# Patient Record
Sex: FEMALE | Race: BLACK OR AFRICAN AMERICAN | NOT HISPANIC OR LATINO | Employment: FULL TIME | URBAN - METROPOLITAN AREA
[De-identification: names, ages, dates, MRNs, and addresses within clinical notes are randomized per-mention and may not be internally consistent; named-entity substitution may affect disease eponyms.]

---

## 2018-06-04 ENCOUNTER — OFFICE VISIT (OUTPATIENT)
Dept: CARDIOLOGY | Facility: CLINIC | Age: 63
End: 2018-06-04
Payer: OTHER GOVERNMENT

## 2018-06-04 VITALS
SYSTOLIC BLOOD PRESSURE: 154 MMHG | BODY MASS INDEX: 28.87 KG/M2 | HEIGHT: 63 IN | HEART RATE: 79 BPM | OXYGEN SATURATION: 100 % | WEIGHT: 162.94 LBS | DIASTOLIC BLOOD PRESSURE: 72 MMHG | RESPIRATION RATE: 15 BRPM

## 2018-06-04 DIAGNOSIS — R07.2 PRECORDIAL PAIN: ICD-10-CM

## 2018-06-04 DIAGNOSIS — F41.9 ANXIETY: ICD-10-CM

## 2018-06-04 DIAGNOSIS — I10 ESSENTIAL HYPERTENSION: Primary | ICD-10-CM

## 2018-06-04 DIAGNOSIS — E11.9 TYPE 2 DIABETES MELLITUS WITHOUT COMPLICATION, WITHOUT LONG-TERM CURRENT USE OF INSULIN: ICD-10-CM

## 2018-06-04 DIAGNOSIS — R94.31 ABNORMAL EKG: ICD-10-CM

## 2018-06-04 DIAGNOSIS — E78.5 HYPERLIPIDEMIA, UNSPECIFIED HYPERLIPIDEMIA TYPE: ICD-10-CM

## 2018-06-04 PROCEDURE — 99999 PR PBB SHADOW E&M-EST. PATIENT-LVL III: CPT | Mod: PBBFAC,,, | Performed by: INTERNAL MEDICINE

## 2018-06-04 PROCEDURE — 93005 ELECTROCARDIOGRAM TRACING: CPT | Mod: PBBFAC | Performed by: INTERNAL MEDICINE

## 2018-06-04 PROCEDURE — 99213 OFFICE O/P EST LOW 20 MIN: CPT | Mod: PBBFAC | Performed by: INTERNAL MEDICINE

## 2018-06-04 PROCEDURE — 99214 OFFICE O/P EST MOD 30 MIN: CPT | Mod: S$PBB,,, | Performed by: INTERNAL MEDICINE

## 2018-06-04 PROCEDURE — 93010 ELECTROCARDIOGRAM REPORT: CPT | Mod: S$PBB,,, | Performed by: INTERNAL MEDICINE

## 2018-06-04 RX ORDER — CYCLOBENZAPRINE HCL 5 MG
5 TABLET ORAL 3 TIMES DAILY PRN
COMMUNITY

## 2018-06-04 RX ORDER — POTASSIUM CHLORIDE 750 MG/1
10 CAPSULE, EXTENDED RELEASE ORAL ONCE
COMMUNITY
End: 2019-09-24 | Stop reason: SDUPTHER

## 2018-06-04 RX ORDER — RAMIPRIL 5 MG/1
5 CAPSULE ORAL DAILY
Qty: 90 CAPSULE | Refills: 3 | Status: SHIPPED | OUTPATIENT
Start: 2018-06-04 | End: 2018-07-10 | Stop reason: SDUPTHER

## 2018-06-04 RX ORDER — AMOXICILLIN 500 MG
CAPSULE ORAL DAILY
COMMUNITY

## 2018-06-04 NOTE — PROGRESS NOTES
CARDIOVASCULAR PROGRESS NOTE    REASON FOR CONSULT:   Makeda Lynch is a 62 y.o. female who presents for follow up of CP, HTN.    PCP: Navid  HISTORY OF PRESENT ILLNESS:   The patient comes in for follow-up.  It is been nearly 2 years since she was last seen in the office.  She's complaining of episodic right-sided atypical chest discomfort which will last for approximately a minute at a clip and occur while at rest.  The patient also describes shortness of breath at rest.  She says that these episodes of chest discomfort and shortness breath occur with anxiety.  She denies any exertional symptoms, but does not exercise on that much.  She otherwise had no palpitations, lightheadedness, dizziness, or syncope.  There's been no PND, orthopnea, or lower extremity edema.  She denies melena, hematuria, or claudicant symptoms.    CARDIOVASCULAR HISTORY:   Cath 2/1/16 normal cors/LVEF    PAST MEDICAL HISTORY:     Past Medical History:   Diagnosis Date    Anxiety     Diabetes mellitus     Hypercholesteremia     Hypertension     Hypothyroidism        PAST SURGICAL HISTORY:     Past Surgical History:   Procedure Laterality Date    CYST REMOVAL      HYSTERECTOMY      WA REMOVAL OF OVARY/TUBE(S)      TONSILLECTOMY         ALLERGIES AND MEDICATION:     Review of patient's allergies indicates:   Allergen Reactions    Codeine Shortness Of Breath     Airway closed    Asa [aspirin]     Azithromycin     Benadryl [diphenhydramine hcl]     Iodinated contrast- oral and iv dye     Latex, natural rubber     Penicillins     Sulfa (sulfonamide antibiotics)     Vicodin [hydrocodone-acetaminophen]      Previous Medications    ALBUTEROL INHL    Inhale into the lungs.    ALPRAZOLAM (XANAX) 0.5 MG TABLET    Take 0.5 mg by mouth 3 (three) times daily.    ATORVASTATIN (LIPITOR) 20 MG TABLET        CYCLOBENZAPRINE (FLEXERIL) 5 MG TABLET    Take 5 mg by mouth 3 (three) times daily as needed for Muscle spasms.    FISH OIL-OMEGA-3  FATTY ACIDS 300-1,000 MG CAPSULE    Take by mouth once daily.    HYDROCHLOROTHIAZIDE (HYDRODIURIL) 12.5 MG TAB    Take 12.5 mg by mouth once daily.    LEVOTHYROXINE 25 MCG CAP    Take by mouth.    METFORMIN (GLUCOPHAGE) 500 MG TABLET    Take 1 tablet (500 mg total) by mouth 2 (two) times daily with meals.    POTASSIUM CHLORIDE (MICRO-K) 10 MEQ CPSR    Take 10 mEq by mouth once.    RAMIPRIL (ALTACE) 2.5 MG CAPSULE    Take 2.5 mg by mouth once daily.       SOCIAL HISTORY:     Social History     Social History    Marital status:      Spouse name: N/A    Number of children: N/A    Years of education: N/A     Occupational History    Not on file.     Social History Main Topics    Smoking status: Never Smoker    Smokeless tobacco: Never Used    Alcohol use No    Drug use: No    Sexual activity: No     Other Topics Concern    Not on file     Social History Narrative    No narrative on file       FAMILY HISTORY:     Family History   Problem Relation Age of Onset    Hypertension Father     Diabetes Mother     Breast cancer Neg Hx     Colon cancer Neg Hx     Ovarian cancer Neg Hx        REVIEW OF SYSTEMS:   Review of Systems   Constitutional: Negative for chills, diaphoresis and fever.   HENT: Negative for nosebleeds.    Eyes: Negative for blurred vision, double vision and photophobia.   Respiratory: Positive for shortness of breath. Negative for hemoptysis and wheezing.    Cardiovascular: Positive for chest pain. Negative for palpitations, orthopnea, claudication, leg swelling and PND.   Gastrointestinal: Negative for abdominal pain, blood in stool, heartburn, melena, nausea and vomiting.   Genitourinary: Negative for flank pain and hematuria.   Musculoskeletal: Negative for falls, myalgias and neck pain.   Skin: Negative for rash.   Neurological: Negative for dizziness, seizures, loss of consciousness, weakness and headaches.   Endo/Heme/Allergies: Negative for polydipsia. Does not bruise/bleed easily.  "  Psychiatric/Behavioral: Negative for depression and memory loss. The patient is nervous/anxious.        PHYSICAL EXAM:     Vitals:    06/04/18 0853   BP: (!) 154/72   Pulse: 79   Resp: 15    Body mass index is 28.86 kg/m².  Weight: 73.9 kg (162 lb 14.7 oz)   Height: 5' 3" (160 cm)     Physical Exam   Constitutional: She is oriented to person, place, and time. She appears well-developed and well-nourished. She is cooperative.  Non-toxic appearance. No distress.   HENT:   Head: Normocephalic and atraumatic.   Eyes: Conjunctivae and EOM are normal. Pupils are equal, round, and reactive to light. No scleral icterus.   Neck: Trachea normal and normal range of motion. Neck supple. Normal carotid pulses and no JVD present. Carotid bruit is not present. No neck rigidity. No tracheal deviation and no edema present. No thyromegaly present.   Cardiovascular: Normal rate, regular rhythm, S1 normal and S2 normal.  PMI is not displaced.  Exam reveals no gallop and no friction rub.    No murmur heard.  Pulses:       Carotid pulses are 2+ on the right side, and 2+ on the left side.  Pulmonary/Chest: Effort normal and breath sounds normal. No respiratory distress. She has no wheezes. She has no rales. She exhibits no tenderness.   Abdominal: Soft. She exhibits no distension. There is no hepatosplenomegaly.   Musculoskeletal: She exhibits no edema or tenderness.   Feet:   Right Foot:   Skin Integrity: Negative for ulcer.   Left Foot:   Skin Integrity: Negative for ulcer.   Neurological: She is alert and oriented to person, place, and time. No cranial nerve deficit.   Skin: Skin is warm and dry. No rash noted. No erythema.   Psychiatric: She has a normal mood and affect. Her speech is normal and behavior is normal.   Vitals reviewed.      DATA:   EKG: (personally reviewed tracing)  6/4/18 SR 78, NSSTTW changes    Laboratory:  CBC:    Recent Labs  Lab 01/31/16  1730 04/18/16  2208 01/23/18  1021   WHITE BLOOD CELL COUNT 5.46 5.86 4.6 "   HEMOGLOBIN 13.0 13.0 13.7   HEMATOCRIT 38.4 38.8 40.6   PLATELETS 201 201 210       CHEMISTRIES:    Recent Labs  Lab 12/09/15  1600 01/31/16  1730 04/18/16  2208   GLUCOSE 107 229 H 122 H   SODIUM 141 142 143   POTASSIUM 3.6 3.8 3.8   BUN BLD 17 19 15   CREATININE 0.8 0.8 0.7   EGFR IF  >60 >60 >60   EGFR IF NON- >60 >60 >60   CALCIUM 9.6 9.1 9.9       CARDIAC BIOMARKERS:    Recent Labs  Lab 01/31/16  2336 02/01/16  0551 04/18/16  2208   TROPONIN I 0.006 <0.006 <0.006       COAGS:    Recent Labs  Lab 01/31/16  1730 04/18/16  2208   INR 1.0 1.0       LIPIDS/LFTS:    Recent Labs  Lab 12/09/15  1600 01/31/16  1730 02/01/16  0551 04/18/16  2208   CHOLESTEROL  --   --  211 H  --    TRIGLYCERIDES  --   --  149  --    HDL  --   --  46  --    LDL CHOLESTEROL  --   --  135.2  --    NON-HDL CHOLESTEROL  --   --  165  --    AST 24 28  --  23   ALT 32 30  --  31       Cardiovascular Testing:  Echo 12/21/15:  1 - Normal left ventricular systolic function (EF 55-60%).   2 - Concentric remodeling.      Ex-MPI 12/21/15:  9min Javier 102% MPHR  Nuclear Quantitative Functional Analysis:   LVEF: >= 70 %  Impression: NORMAL MYOCARDIAL PERFUSION  1. The perfusion scan is free of evidence for myocardial ischemia or injury.   2. Resting wall motion is physiologic.   3. Resting LV function is normal.   4. The ventricular volumes are normal at rest and stress.   5. The extracardiac distribution of radioactivity is normal.     Cath 2/1/16  LVEDP (Pre): 7 mmHg  Ejection Fraction: 70%  C. ANGIOGRAPHIC RESULTS:  Patient has a right dominant coronary artery.   - Left Main Coronary Artery:  The LM is normal. There is HOUSTON 3 flow.  - Left Anterior Descending Artery:  The LAD has luminal irregularities. There is HOUSTON 3 flow.  - Left Circumflex Artery:  The LCX is normal. There is HOUSTON 3 flow.  - Right Coronary Artery:  The RCA is normal. There is HOUSTON 3 flow.  D. SUMMARY/POST-OPERATIVE DIAGNOSIS:  1. Recurrent CP  with recent neg MPI.  2. Essentially normal coronary arteries.  3. Normal LV function.     ASSESSMENT:   # CP, atypical. Prior testing (including cardiac cath) normal 2016.  Consider GI consult if sxs persist.  # HTN, uncontrolled.   # HLP, on atorva 20mg  # DM, per IM  # h/o ASA allergy  # abnl EKG     PLAN:   Cont med rx  Stop HCTZ  Inc ramipril 5mg qd  Check BMP 2 weeks  Check echo  Obtain lipid panel/LFT from PCP  Consider GI eval if sxs of CP persist  Pt to bring BP log to next OV  RTC 4 weeks        Conrad España MD, FACC

## 2018-06-20 ENCOUNTER — HOSPITAL ENCOUNTER (OUTPATIENT)
Dept: CARDIOLOGY | Facility: HOSPITAL | Age: 63
Discharge: HOME | End: 2018-06-20
Attending: INTERNAL MEDICINE
Payer: OTHER GOVERNMENT

## 2018-06-20 DIAGNOSIS — R07.2 PRECORDIAL PAIN: ICD-10-CM

## 2018-06-20 DIAGNOSIS — I10 ESSENTIAL HYPERTENSION: ICD-10-CM

## 2018-06-20 DIAGNOSIS — R94.31 ABNORMAL EKG: ICD-10-CM

## 2018-06-20 LAB
DIASTOLIC DYSFUNCTION: NO
ESTIMATED PA SYSTOLIC PRESSURE: 21.84
MITRAL VALVE REGURGITATION: NORMAL
RETIRED EF AND QEF - SEE NOTES: 60 (ref 55–65)
TRICUSPID VALVE REGURGITATION: NORMAL

## 2018-06-20 PROCEDURE — 93306 TTE W/DOPPLER COMPLETE: CPT | Mod: 26,,, | Performed by: INTERNAL MEDICINE

## 2018-06-20 PROCEDURE — 93306 TTE W/DOPPLER COMPLETE: CPT

## 2018-07-10 ENCOUNTER — OFFICE VISIT (OUTPATIENT)
Dept: CARDIOLOGY | Facility: CLINIC | Age: 63
End: 2018-07-10
Payer: OTHER GOVERNMENT

## 2018-07-10 VITALS
HEIGHT: 63 IN | SYSTOLIC BLOOD PRESSURE: 152 MMHG | WEIGHT: 158 LBS | HEART RATE: 62 BPM | BODY MASS INDEX: 28 KG/M2 | RESPIRATION RATE: 15 BRPM | DIASTOLIC BLOOD PRESSURE: 78 MMHG | OXYGEN SATURATION: 98 %

## 2018-07-10 DIAGNOSIS — I10 ESSENTIAL HYPERTENSION: Primary | ICD-10-CM

## 2018-07-10 DIAGNOSIS — E11.9 TYPE 2 DIABETES MELLITUS WITHOUT COMPLICATION, WITHOUT LONG-TERM CURRENT USE OF INSULIN: ICD-10-CM

## 2018-07-10 DIAGNOSIS — E78.2 MIXED HYPERLIPIDEMIA: ICD-10-CM

## 2018-07-10 PROCEDURE — 99213 OFFICE O/P EST LOW 20 MIN: CPT | Mod: PBBFAC | Performed by: INTERNAL MEDICINE

## 2018-07-10 PROCEDURE — 99213 OFFICE O/P EST LOW 20 MIN: CPT | Mod: S$PBB,,, | Performed by: INTERNAL MEDICINE

## 2018-07-10 PROCEDURE — 99999 PR PBB SHADOW E&M-EST. PATIENT-LVL III: CPT | Mod: PBBFAC,,, | Performed by: INTERNAL MEDICINE

## 2018-07-10 RX ORDER — RAMIPRIL 10 MG/1
10 CAPSULE ORAL DAILY
Qty: 90 CAPSULE | Refills: 3 | Status: SHIPPED | OUTPATIENT
Start: 2018-07-10 | End: 2018-08-13 | Stop reason: SDUPTHER

## 2018-07-10 NOTE — PROGRESS NOTES
CARDIOVASCULAR PROGRESS NOTE    REASON FOR CONSULT:   Makeda Lynch is a 62 y.o. female who presents for follow up of CP, HTN.    PCP: Navid  HISTORY OF PRESENT ILLNESS:   The patient comes in for follow-up.  She reports no further chest discomfort, nor any dyspnea.  There has been no palpitations, lightheadedness, dizziness, or syncope.  She denies PND, orthopnea, or lower extremity edema.  There has been no melena, hematuria, or claudicant symptoms.    The patient brings with her a home log of her blood pressures noting sporadic measurements greater than 140/90.  I also reviewed records of recent laboratories from the patient's primary care physician.    CARDIOVASCULAR HISTORY:   Cath 2/1/16 normal cors/LVEF    PAST MEDICAL HISTORY:     Past Medical History:   Diagnosis Date    Anxiety     Diabetes mellitus     Hypercholesteremia     Hypertension     Hypothyroidism        PAST SURGICAL HISTORY:     Past Surgical History:   Procedure Laterality Date    CYST REMOVAL      HYSTERECTOMY      ID REMOVAL OF OVARY/TUBE(S)      TONSILLECTOMY         ALLERGIES AND MEDICATION:     Review of patient's allergies indicates:   Allergen Reactions    Codeine Shortness Of Breath     Airway closed    Asa [aspirin]     Azithromycin     Benadryl [diphenhydramine hcl]     Iodinated contrast- oral and iv dye     Latex, natural rubber     Penicillins     Sulfa (sulfonamide antibiotics)     Vicodin [hydrocodone-acetaminophen]      Previous Medications    ALBUTEROL INHL    Inhale into the lungs.    ALPRAZOLAM (XANAX) 0.5 MG TABLET    Take 0.5 mg by mouth 3 (three) times daily.    ATORVASTATIN (LIPITOR) 20 MG TABLET        CYCLOBENZAPRINE (FLEXERIL) 5 MG TABLET    Take 5 mg by mouth 3 (three) times daily as needed for Muscle spasms.    FISH OIL-OMEGA-3 FATTY ACIDS 300-1,000 MG CAPSULE    Take by mouth once daily.    LEVOTHYROXINE 25 MCG CAP    Take by mouth.    METFORMIN (GLUCOPHAGE) 500 MG TABLET    Take 1 tablet (500  "mg total) by mouth 2 (two) times daily with meals.    POTASSIUM CHLORIDE (MICRO-K) 10 MEQ CPSR    Take 10 mEq by mouth once.    RAMIPRIL (ALTACE) 5 MG CAPSULE    Take 1 capsule (5 mg total) by mouth once daily.       SOCIAL HISTORY:     Social History     Social History    Marital status:      Spouse name: N/A    Number of children: N/A    Years of education: N/A     Occupational History    Not on file.     Social History Main Topics    Smoking status: Never Smoker    Smokeless tobacco: Never Used    Alcohol use No    Drug use: No    Sexual activity: No     Other Topics Concern    Not on file     Social History Narrative    No narrative on file       FAMILY HISTORY:     Family History   Problem Relation Age of Onset    Hypertension Father     Diabetes Mother     Breast cancer Neg Hx     Colon cancer Neg Hx     Ovarian cancer Neg Hx        REVIEW OF SYSTEMS:   Review of Systems   Constitutional: Negative for chills, diaphoresis and fever.   HENT: Negative for nosebleeds.    Eyes: Negative for blurred vision, double vision and photophobia.   Respiratory: Negative for hemoptysis, shortness of breath and wheezing.    Cardiovascular: Negative for chest pain, palpitations, orthopnea, claudication, leg swelling and PND.   Gastrointestinal: Negative for abdominal pain, blood in stool, heartburn, melena, nausea and vomiting.   Genitourinary: Negative for flank pain and hematuria.   Musculoskeletal: Negative for falls, myalgias and neck pain.   Skin: Negative for rash.   Neurological: Negative for dizziness, seizures, loss of consciousness, weakness and headaches.   Endo/Heme/Allergies: Negative for polydipsia. Does not bruise/bleed easily.   Psychiatric/Behavioral: Negative for depression and memory loss.       PHYSICAL EXAM:     Vitals:    07/10/18 0841   BP: (!) 152/78   Pulse: 62   Resp: 15    Body mass index is 27.99 kg/m².  Weight: 71.7 kg (158 lb)   Height: 5' 3" (160 cm)     Physical Exam "   Constitutional: She is oriented to person, place, and time. She appears well-developed and well-nourished. She is cooperative.  Non-toxic appearance. No distress.   HENT:   Head: Normocephalic and atraumatic.   Eyes: Conjunctivae and EOM are normal. Pupils are equal, round, and reactive to light. No scleral icterus.   Neck: Trachea normal and normal range of motion. Neck supple. Normal carotid pulses and no JVD present. Carotid bruit is not present. No neck rigidity. No tracheal deviation and no edema present. No thyromegaly present.   Cardiovascular: Normal rate, regular rhythm, S1 normal and S2 normal.  PMI is not displaced.  Exam reveals no gallop and no friction rub.    No murmur heard.  Pulses:       Carotid pulses are 2+ on the right side, and 2+ on the left side.  Pulmonary/Chest: Effort normal and breath sounds normal. No respiratory distress. She has no wheezes. She has no rales. She exhibits no tenderness.   Abdominal: Soft. She exhibits no distension. There is no hepatosplenomegaly.   Musculoskeletal: She exhibits no edema or tenderness.   Feet:   Right Foot:   Skin Integrity: Negative for ulcer.   Left Foot:   Skin Integrity: Negative for ulcer.   Neurological: She is alert and oriented to person, place, and time. No cranial nerve deficit.   Skin: Skin is warm and dry. No rash noted. No erythema.   Psychiatric: She has a normal mood and affect. Her speech is normal and behavior is normal.   Vitals reviewed.      DATA:   EKG: (personally reviewed tracing)  6/4/18 SR 78, NSSTTW changes    Laboratory:  CBC:    Recent Labs  Lab 01/31/16  1730 04/18/16 2208 01/23/18  1021   WHITE BLOOD CELL COUNT 5.46 5.86 4.6   HEMOGLOBIN 13.0 13.0 13.7   HEMATOCRIT 38.4 38.8 40.6   PLATELETS 201 201 210       CHEMISTRIES:    Recent Labs  Lab 01/31/16  1730 04/18/16  2208 06/20/18  0751   GLUCOSE 229 H 122 H 189 H   SODIUM 142 143 139   POTASSIUM 3.8 3.8 4.2   BUN BLD 19 15 16   CREATININE 0.8 0.7 0.8   EGFR IF   AMERICAN >60 >60 >60   EGFR IF NON- >60 >60 >60   CALCIUM 9.1 9.9 9.4       CARDIAC BIOMARKERS:    Recent Labs  Lab 01/31/16  2336 02/01/16  0551 04/18/16  2208   TROPONIN I 0.006 <0.006 <0.006       COAGS:    Recent Labs  Lab 01/31/16  1730 04/18/16  2208   INR 1.0 1.0       LIPIDS/LFTS:    Recent Labs  Lab 12/09/15  1600 01/31/16  1730 02/01/16  0551 04/18/16  2208   CHOLESTEROL  --   --  211 H  --    TRIGLYCERIDES  --   --  149  --    HDL  --   --  46  --    LDL CHOLESTEROL  --   --  135.2  --    NON-HDL CHOLESTEROL  --   --  165  --    AST 24 28  --  23   ALT 32 30  --  31     Lipid panel from PCP 8/2017:    TG 67  HDL 45  LDL 73    LFT from [facility] 5/2018:  AST 22  ALT 28    Cardiovascular Testing:  Echo 6/20/18    1 - Normal left ventricular systolic function (EF 60-65%).     2 - Concentric remodeling.     3 - Trivial to mild mitral regurgitation.     4 - Trivial to mild tricuspid regurgitation.     5 - Trivial pulmonic regurgitation.      Ex-MPI 12/21/15:  9min Javier 102% MPHR  Nuclear Quantitative Functional Analysis:   LVEF: >= 70 %  Impression: NORMAL MYOCARDIAL PERFUSION  1. The perfusion scan is free of evidence for myocardial ischemia or injury.   2. Resting wall motion is physiologic.   3. Resting LV function is normal.   4. The ventricular volumes are normal at rest and stress.   5. The extracardiac distribution of radioactivity is normal.     Cath 2/1/16  LVEDP (Pre): 7 mmHg  Ejection Fraction: 70%  C. ANGIOGRAPHIC RESULTS:  Patient has a right dominant coronary artery.   - Left Main Coronary Artery:  The LM is normal. There is HOUSTON 3 flow.  - Left Anterior Descending Artery:  The LAD has luminal irregularities. There is HOUSTON 3 flow.  - Left Circumflex Artery:  The LCX is normal. There is HOUSTON 3 flow.  - Right Coronary Artery:  The RCA is normal. There is HOUSTON 3 flow.  D. SUMMARY/POST-OPERATIVE DIAGNOSIS:  1. Recurrent CP with recent neg MPI.  2. Essentially normal coronary  arteries.  3. Normal LV function.     ASSESSMENT:   # CP, resolved. Prior testing (including cardiac cath) normal 2016.  Echo 6/2018 normal.  Consider GI consult if sxs recur.  # HTN, uncontrolled.   # HLP, on atorva 20mg  # DM, per IM  # h/o ASA allergy  # abnl EKG     PLAN:   Cont med rx  Inc ramipril 10mg qd  Check BMP 2 weeks  RTC 1 month        Conrad España MD, FACC

## 2018-07-24 ENCOUNTER — LAB VISIT (OUTPATIENT)
Dept: LAB | Facility: HOSPITAL | Age: 63
End: 2018-07-24
Attending: INTERNAL MEDICINE
Payer: OTHER GOVERNMENT

## 2018-07-24 DIAGNOSIS — I10 ESSENTIAL HYPERTENSION: ICD-10-CM

## 2018-07-24 LAB
ANION GAP SERPL CALC-SCNC: 11 MMOL/L
BUN SERPL-MCNC: 13 MG/DL
CALCIUM SERPL-MCNC: 9.5 MG/DL
CHLORIDE SERPL-SCNC: 105 MMOL/L
CO2 SERPL-SCNC: 25 MMOL/L
CREAT SERPL-MCNC: 0.8 MG/DL
EST. GFR  (AFRICAN AMERICAN): >60 ML/MIN/1.73 M^2
EST. GFR  (NON AFRICAN AMERICAN): >60 ML/MIN/1.73 M^2
GLUCOSE SERPL-MCNC: 238 MG/DL
POTASSIUM SERPL-SCNC: 4.8 MMOL/L
SODIUM SERPL-SCNC: 141 MMOL/L

## 2018-07-24 PROCEDURE — 80048 BASIC METABOLIC PNL TOTAL CA: CPT

## 2018-07-24 PROCEDURE — 36415 COLL VENOUS BLD VENIPUNCTURE: CPT

## 2018-08-13 ENCOUNTER — OFFICE VISIT (OUTPATIENT)
Dept: CARDIOLOGY | Facility: CLINIC | Age: 63
End: 2018-08-13
Payer: OTHER GOVERNMENT

## 2018-08-13 VITALS
SYSTOLIC BLOOD PRESSURE: 142 MMHG | DIASTOLIC BLOOD PRESSURE: 78 MMHG | WEIGHT: 163 LBS | HEIGHT: 63 IN | RESPIRATION RATE: 15 BRPM | HEART RATE: 80 BPM | OXYGEN SATURATION: 100 % | BODY MASS INDEX: 28.88 KG/M2

## 2018-08-13 DIAGNOSIS — E11.9 TYPE 2 DIABETES MELLITUS WITHOUT COMPLICATION, WITHOUT LONG-TERM CURRENT USE OF INSULIN: ICD-10-CM

## 2018-08-13 DIAGNOSIS — I10 ESSENTIAL HYPERTENSION: Primary | ICD-10-CM

## 2018-08-13 DIAGNOSIS — E78.2 MIXED HYPERLIPIDEMIA: ICD-10-CM

## 2018-08-13 PROCEDURE — 99213 OFFICE O/P EST LOW 20 MIN: CPT | Mod: S$PBB,,, | Performed by: INTERNAL MEDICINE

## 2018-08-13 PROCEDURE — 99214 OFFICE O/P EST MOD 30 MIN: CPT | Mod: PBBFAC | Performed by: INTERNAL MEDICINE

## 2018-08-13 PROCEDURE — 99999 PR PBB SHADOW E&M-EST. PATIENT-LVL IV: CPT | Mod: PBBFAC,,, | Performed by: INTERNAL MEDICINE

## 2018-08-13 RX ORDER — RAMIPRIL 10 MG/1
20 CAPSULE ORAL DAILY
Qty: 180 CAPSULE | Refills: 3 | Status: SHIPPED | OUTPATIENT
Start: 2018-08-13 | End: 2018-11-07 | Stop reason: SDUPTHER

## 2018-08-13 NOTE — PROGRESS NOTES
CARDIOVASCULAR PROGRESS NOTE    REASON FOR CONSULT:   Makeda Lynch is a 62 y.o. female who presents for follow up of CP, HTN.    PCP: Navid  HISTORY OF PRESENT ILLNESS:   The patient comes in for follow-up.  She reports no chest discomfort, nor any dyspnea.  There has been no palpitations, lightheadedness, dizziness, or syncope.  She denies PND, orthopnea, or lower extremity edema.  There has been no melena, hematuria, or claudicant symptoms.    CARDIOVASCULAR HISTORY:   Cath 2/1/16 normal cors/LVEF    PAST MEDICAL HISTORY:     Past Medical History:   Diagnosis Date    Anxiety     Diabetes mellitus     Hypercholesteremia     Hypertension     Hypothyroidism        PAST SURGICAL HISTORY:     Past Surgical History:   Procedure Laterality Date    CYST REMOVAL      HYSTERECTOMY      FL REMOVAL OF OVARY/TUBE(S)      TONSILLECTOMY         ALLERGIES AND MEDICATION:     Review of patient's allergies indicates:   Allergen Reactions    Codeine Shortness Of Breath     Airway closed    Asa [aspirin]     Azithromycin     Benadryl [diphenhydramine hcl]     Iodinated contrast- oral and iv dye     Latex, natural rubber     Penicillins     Sulfa (sulfonamide antibiotics)     Vicodin [hydrocodone-acetaminophen]      Previous Medications    ALBUTEROL INHL    Inhale into the lungs.    ALPRAZOLAM (XANAX) 0.5 MG TABLET    Take 0.5 mg by mouth 3 (three) times daily.    ATORVASTATIN (LIPITOR) 20 MG TABLET        CYCLOBENZAPRINE (FLEXERIL) 5 MG TABLET    Take 5 mg by mouth 3 (three) times daily as needed for Muscle spasms.    FISH OIL-OMEGA-3 FATTY ACIDS 300-1,000 MG CAPSULE    Take by mouth once daily.    LEVOTHYROXINE 25 MCG CAP    Take by mouth.    METFORMIN (GLUCOPHAGE) 500 MG TABLET    Take 1 tablet (500 mg total) by mouth 2 (two) times daily with meals.    POTASSIUM CHLORIDE (MICRO-K) 10 MEQ CPSR    Take 10 mEq by mouth once.    RAMIPRIL (ALTACE) 10 MG CAPSULE    Take 1 capsule (10 mg total) by mouth once daily.        SOCIAL HISTORY:     Social History     Socioeconomic History    Marital status:      Spouse name: Not on file    Number of children: Not on file    Years of education: Not on file    Highest education level: Not on file   Social Needs    Financial resource strain: Not on file    Food insecurity - worry: Not on file    Food insecurity - inability: Not on file    Transportation needs - medical: Not on file    Transportation needs - non-medical: Not on file   Occupational History    Not on file   Tobacco Use    Smoking status: Never Smoker    Smokeless tobacco: Never Used   Substance and Sexual Activity    Alcohol use: No    Drug use: No    Sexual activity: No   Other Topics Concern    Not on file   Social History Narrative    Not on file       FAMILY HISTORY:     Family History   Problem Relation Age of Onset    Hypertension Father     Diabetes Mother     Breast cancer Neg Hx     Colon cancer Neg Hx     Ovarian cancer Neg Hx        REVIEW OF SYSTEMS:   Review of Systems   Constitutional: Negative for chills, diaphoresis and fever.   HENT: Negative for nosebleeds.    Eyes: Negative for blurred vision, double vision and photophobia.   Respiratory: Negative for hemoptysis, shortness of breath and wheezing.    Cardiovascular: Negative for chest pain, palpitations, orthopnea, claudication, leg swelling and PND.   Gastrointestinal: Negative for abdominal pain, blood in stool, heartburn, melena, nausea and vomiting.   Genitourinary: Negative for flank pain and hematuria.   Musculoskeletal: Negative for falls, myalgias and neck pain.   Skin: Negative for rash.   Neurological: Negative for dizziness, seizures, loss of consciousness, weakness and headaches.   Endo/Heme/Allergies: Negative for polydipsia. Does not bruise/bleed easily.   Psychiatric/Behavioral: Negative for depression and memory loss.       PHYSICAL EXAM:     Vitals:    08/13/18 1319   BP: (!) 142/78   Pulse: 80   Resp: 15     "Body mass index is 28.87 kg/m².  Weight: 73.9 kg (163 lb)   Height: 5' 3" (160 cm)     Physical Exam   Constitutional: She is oriented to person, place, and time. She appears well-developed and well-nourished. She is cooperative.  Non-toxic appearance. No distress.   HENT:   Head: Normocephalic and atraumatic.   Eyes: Conjunctivae and EOM are normal. Pupils are equal, round, and reactive to light. No scleral icterus.   Neck: Trachea normal and normal range of motion. Neck supple. Normal carotid pulses and no JVD present. Carotid bruit is not present. No neck rigidity. No tracheal deviation and no edema present. No thyromegaly present.   Cardiovascular: Normal rate, regular rhythm, S1 normal and S2 normal. PMI is not displaced. Exam reveals no gallop and no friction rub.   No murmur heard.  Pulses:       Carotid pulses are 2+ on the right side, and 2+ on the left side.  Pulmonary/Chest: Effort normal and breath sounds normal. No respiratory distress. She has no wheezes. She has no rales. She exhibits no tenderness.   Abdominal: Soft. She exhibits no distension. There is no hepatosplenomegaly.   Musculoskeletal: She exhibits no edema or tenderness.   Feet:   Right Foot:   Skin Integrity: Negative for ulcer.   Left Foot:   Skin Integrity: Negative for ulcer.   Neurological: She is alert and oriented to person, place, and time. No cranial nerve deficit.   Skin: Skin is warm and dry. No rash noted. No erythema.   Psychiatric: She has a normal mood and affect. Her speech is normal and behavior is normal.   Vitals reviewed.      DATA:   EKG: (personally reviewed tracing)  6/4/18 SR 78, NSSTTW changes    Laboratory:  CBC:  Recent Labs   Lab  01/31/16   1730  04/18/16   2208  01/23/18   1021   WHITE BLOOD CELL COUNT  5.46  5.86  4.6   HEMOGLOBIN  13.0  13.0  13.7   HEMATOCRIT  38.4  38.8  40.6   PLATELETS  201  201  210       CHEMISTRIES:  Recent Labs   Lab  04/18/16   2208  06/20/18   0751  07/24/18   0757   GLUCOSE  122 H  " 189 H  238 H   SODIUM  143  139  141   POTASSIUM  3.8  4.2  4.8   BUN BLD  15  16  13   CREATININE  0.7  0.8  0.8   EGFR IF AFRICAN AMERICAN  >60  >60  >60   EGFR IF NON-  >60  >60  >60   CALCIUM  9.9  9.4  9.5       CARDIAC BIOMARKERS:  Recent Labs   Lab  01/31/16   2336  02/01/16   0551  04/18/16   2208   TROPONIN I  0.006  <0.006  <0.006       COAGS:  Recent Labs   Lab  01/31/16   1730  04/18/16   2208   INR  1.0  1.0       LIPIDS/LFTS:  Recent Labs   Lab  12/09/15   1600  01/31/16   1730  02/01/16   0551  04/18/16   2208   CHOLESTEROL   --    --   211 H   --    TRIGLYCERIDES   --    --   149   --    HDL   --    --   46   --    LDL CHOLESTEROL   --    --   135.2   --    NON-HDL CHOLESTEROL   --    --   165   --    AST  24  28   --   23   ALT  32  30   --   31     Lipid panel from PCP 8/2017:    TG 67  HDL 45  LDL 73    LFT from PCP 5/2018:  AST 22  ALT 28    Cardiovascular Testing:  Echo 6/20/18    1 - Normal left ventricular systolic function (EF 60-65%).     2 - Concentric remodeling.     3 - Trivial to mild mitral regurgitation.     4 - Trivial to mild tricuspid regurgitation.     5 - Trivial pulmonic regurgitation.      Ex-MPI 12/21/15:  9min Javier 102% MPHR  Nuclear Quantitative Functional Analysis:   LVEF: >= 70 %  Impression: NORMAL MYOCARDIAL PERFUSION  1. The perfusion scan is free of evidence for myocardial ischemia or injury.   2. Resting wall motion is physiologic.   3. Resting LV function is normal.   4. The ventricular volumes are normal at rest and stress.   5. The extracardiac distribution of radioactivity is normal.     Cath 2/1/16  LVEDP (Pre): 7 mmHg  Ejection Fraction: 70%  C. ANGIOGRAPHIC RESULTS:  Patient has a right dominant coronary artery.   - Left Main Coronary Artery:  The LM is normal. There is HOUSTON 3 flow.  - Left Anterior Descending Artery:  The LAD has luminal irregularities. There is HOUSTON 3 flow.  - Left Circumflex Artery:  The LCX is normal. There is HOUSTON 3  flow.  - Right Coronary Artery:  The RCA is normal. There is HOUSTON 3 flow.  D. SUMMARY/POST-OPERATIVE DIAGNOSIS:  1. Recurrent CP with recent neg MPI.  2. Essentially normal coronary arteries.  3. Normal LV function.     ASSESSMENT:   # CP, resolved. Prior testing (including cardiac cath) normal 2016.  Echo 6/2018 normal.  Consider GI consult if sxs recur.  # HTN, uncontrolled.   # HLP, on atorva 20mg  # DM, per IM  # h/o ASA allergy  # abnl EKG     PLAN:   Cont med rx  Inc ramipril 20mg qd  Check BMP 2 weeks  Pt to bring BP log to next OV  RTC 1 month  Next drug: manan España MD, FACC

## 2018-08-29 ENCOUNTER — LAB VISIT (OUTPATIENT)
Dept: LAB | Facility: HOSPITAL | Age: 63
End: 2018-08-29
Attending: INTERNAL MEDICINE
Payer: OTHER GOVERNMENT

## 2018-08-29 DIAGNOSIS — E11.9 TYPE 2 DIABETES MELLITUS WITHOUT COMPLICATION, WITHOUT LONG-TERM CURRENT USE OF INSULIN: ICD-10-CM

## 2018-08-29 DIAGNOSIS — I10 ESSENTIAL HYPERTENSION: ICD-10-CM

## 2018-08-29 LAB
ANION GAP SERPL CALC-SCNC: 10 MMOL/L
BUN SERPL-MCNC: 16 MG/DL
CALCIUM SERPL-MCNC: 9.3 MG/DL
CHLORIDE SERPL-SCNC: 103 MMOL/L
CO2 SERPL-SCNC: 26 MMOL/L
CREAT SERPL-MCNC: 0.8 MG/DL
EST. GFR  (AFRICAN AMERICAN): >60 ML/MIN/1.73 M^2
EST. GFR  (NON AFRICAN AMERICAN): >60 ML/MIN/1.73 M^2
GLUCOSE SERPL-MCNC: 234 MG/DL
POTASSIUM SERPL-SCNC: 4.3 MMOL/L
SODIUM SERPL-SCNC: 139 MMOL/L

## 2018-08-29 PROCEDURE — 36415 COLL VENOUS BLD VENIPUNCTURE: CPT

## 2018-08-29 PROCEDURE — 80048 BASIC METABOLIC PNL TOTAL CA: CPT

## 2018-09-12 ENCOUNTER — TELEPHONE (OUTPATIENT)
Dept: OTOLARYNGOLOGY | Facility: CLINIC | Age: 63
End: 2018-09-12

## 2018-09-12 ENCOUNTER — CLINICAL SUPPORT (OUTPATIENT)
Dept: OTOLARYNGOLOGY | Facility: CLINIC | Age: 63
End: 2018-09-12
Payer: OTHER GOVERNMENT

## 2018-09-12 DIAGNOSIS — J30.1 ALLERGIC RHINITIS DUE TO POLLEN, UNSPECIFIED SEASONALITY: Primary | ICD-10-CM

## 2018-09-12 PROCEDURE — 99499 UNLISTED E&M SERVICE: CPT | Mod: S$GLB,,, | Performed by: OTOLARYNGOLOGY

## 2018-09-12 PROCEDURE — 95115 IMMUNOTHERAPY ONE INJECTION: CPT | Mod: S$GLB,,, | Performed by: OTOLARYNGOLOGY

## 2018-09-12 NOTE — TELEPHONE ENCOUNTER
----- Message from Sue Montelongo sent at 9/12/2018 10:12 AM CDT -----  Contact: [phone]  Pt is requesting a appt for her allergy injection. Pl's call pt [phone]. Thanks......Radha

## 2018-09-19 ENCOUNTER — OFFICE VISIT (OUTPATIENT)
Dept: CARDIOLOGY | Facility: CLINIC | Age: 63
End: 2018-09-19
Payer: OTHER GOVERNMENT

## 2018-09-19 VITALS
HEART RATE: 78 BPM | HEIGHT: 62 IN | OXYGEN SATURATION: 94 % | DIASTOLIC BLOOD PRESSURE: 82 MMHG | SYSTOLIC BLOOD PRESSURE: 138 MMHG | WEIGHT: 174.38 LBS | BODY MASS INDEX: 32.09 KG/M2

## 2018-09-19 DIAGNOSIS — E66.9 NON MORBID OBESITY, UNSPECIFIED OBESITY TYPE: ICD-10-CM

## 2018-09-19 DIAGNOSIS — E78.2 MIXED HYPERLIPIDEMIA: ICD-10-CM

## 2018-09-19 DIAGNOSIS — I10 ESSENTIAL HYPERTENSION: Primary | ICD-10-CM

## 2018-09-19 DIAGNOSIS — E11.9 TYPE 2 DIABETES MELLITUS WITHOUT COMPLICATION, WITHOUT LONG-TERM CURRENT USE OF INSULIN: ICD-10-CM

## 2018-09-19 PROCEDURE — 99999 PR PBB SHADOW E&M-EST. PATIENT-LVL III: CPT | Mod: PBBFAC,,, | Performed by: INTERNAL MEDICINE

## 2018-09-19 PROCEDURE — 99213 OFFICE O/P EST LOW 20 MIN: CPT | Mod: PBBFAC | Performed by: INTERNAL MEDICINE

## 2018-09-19 PROCEDURE — 99214 OFFICE O/P EST MOD 30 MIN: CPT | Mod: S$PBB,,, | Performed by: INTERNAL MEDICINE

## 2018-09-19 NOTE — PROGRESS NOTES
CARDIOVASCULAR PROGRESS NOTE    REASON FOR CONSULT:   Makeda Lynch is a 62 y.o. female who presents for follow up of CP, HTN.    PCP: Navid  HISTORY OF PRESENT ILLNESS:   The patient comes in for follow-up.  She reports no chest discomfort, nor any dyspnea.  There has been no palpitations, lightheadedness, dizziness, or syncope.  She denies PND, orthopnea, or lower extremity edema.  There has been no melena, hematuria, or claudicant symptoms.    CARDIOVASCULAR HISTORY:   Cath 2/1/16 normal cors/LVEF    PAST MEDICAL HISTORY:     Past Medical History:   Diagnosis Date    Anxiety     Diabetes mellitus     Hypercholesteremia     Hypertension     Hypothyroidism        PAST SURGICAL HISTORY:     Past Surgical History:   Procedure Laterality Date    CYST REMOVAL      HYSTERECTOMY      HI REMOVAL OF OVARY/TUBE(S)      TONSILLECTOMY         ALLERGIES AND MEDICATION:     Review of patient's allergies indicates:   Allergen Reactions    Codeine Shortness Of Breath     Airway closed    Asa [aspirin]     Azithromycin     Benadryl [diphenhydramine hcl]     Iodinated contrast- oral and iv dye     Latex, natural rubber     Penicillins     Sulfa (sulfonamide antibiotics)     Vicodin [hydrocodone-acetaminophen]      This SmartLink is deprecated. Use Unicon instead to display the medication list for a patient.    SOCIAL HISTORY:     Social History     Socioeconomic History    Marital status:      Spouse name: Not on file    Number of children: Not on file    Years of education: Not on file    Highest education level: Not on file   Social Needs    Financial resource strain: Not on file    Food insecurity - worry: Not on file    Food insecurity - inability: Not on file    Transportation needs - medical: Not on file    Transportation needs - non-medical: Not on file   Occupational History    Not on file   Tobacco Use    Smoking status: Never Smoker    Smokeless tobacco: Never Used   Substance  "and Sexual Activity    Alcohol use: No    Drug use: No    Sexual activity: No   Other Topics Concern    Not on file   Social History Narrative    Not on file       FAMILY HISTORY:     Family History   Problem Relation Age of Onset    Hypertension Father     Diabetes Mother     Breast cancer Neg Hx     Colon cancer Neg Hx     Ovarian cancer Neg Hx        REVIEW OF SYSTEMS:   Review of Systems   Constitutional: Negative for chills, diaphoresis and fever.   HENT: Negative for nosebleeds.    Eyes: Negative for blurred vision, double vision and photophobia.   Respiratory: Negative for hemoptysis, shortness of breath and wheezing.    Cardiovascular: Negative for chest pain, palpitations, orthopnea, claudication, leg swelling and PND.   Gastrointestinal: Negative for abdominal pain, blood in stool, heartburn, melena, nausea and vomiting.   Genitourinary: Negative for flank pain and hematuria.   Musculoskeletal: Negative for falls, myalgias and neck pain.   Skin: Negative for rash.   Neurological: Negative for dizziness, seizures, loss of consciousness, weakness and headaches.   Endo/Heme/Allergies: Negative for polydipsia. Does not bruise/bleed easily.   Psychiatric/Behavioral: Negative for depression and memory loss.       PHYSICAL EXAM:     Vitals:    09/19/18 1301   BP: 138/82   Pulse: 78    Body mass index is 31.9 kg/m².  Weight: 79.1 kg (174 lb 6.1 oz)   Height: 5' 2" (157.5 cm)     Physical Exam   Constitutional: She is oriented to person, place, and time. She appears well-developed and well-nourished. She is cooperative.  Non-toxic appearance. No distress.   HENT:   Head: Normocephalic and atraumatic.   Eyes: Conjunctivae and EOM are normal. Pupils are equal, round, and reactive to light. No scleral icterus.   Neck: Trachea normal and normal range of motion. Neck supple. Normal carotid pulses and no JVD present. Carotid bruit is not present. No neck rigidity. No tracheal deviation and no edema present. No " thyromegaly present.   Cardiovascular: Normal rate, regular rhythm, S1 normal and S2 normal. PMI is not displaced. Exam reveals no gallop and no friction rub.   No murmur heard.  Pulses:       Carotid pulses are 2+ on the right side, and 2+ on the left side.  Pulmonary/Chest: Effort normal and breath sounds normal. No respiratory distress. She has no wheezes. She has no rales. She exhibits no tenderness.   Abdominal: Soft. She exhibits no distension. There is no hepatosplenomegaly.   Musculoskeletal: She exhibits no edema or tenderness.   Feet:   Right Foot:   Skin Integrity: Negative for ulcer.   Left Foot:   Skin Integrity: Negative for ulcer.   Neurological: She is alert and oriented to person, place, and time. No cranial nerve deficit.   Skin: Skin is warm and dry. No rash noted. No erythema.   Psychiatric: She has a normal mood and affect. Her speech is normal and behavior is normal.   Vitals reviewed.      DATA:   EKG: (personally reviewed tracing)  6/4/18 SR 78, NSSTTW changes    Laboratory:  CBC:  Recent Labs   Lab  01/31/16   1730 04/18/16 2208 01/23/18   1021   WHITE BLOOD CELL COUNT  5.46  5.86  4.6   HEMOGLOBIN  13.0  13.0  13.7   HEMATOCRIT  38.4  38.8  40.6   PLATELETS  201  201  210       CHEMISTRIES:  Recent Labs   Lab  06/20/18   0751  07/24/18   0757  08/29/18   0809   GLUCOSE  189 H  238 H  234 H   SODIUM  139  141  139   POTASSIUM  4.2  4.8  4.3   BUN BLD  16  13  16   CREATININE  0.8  0.8  0.8   EGFR IF AFRICAN AMERICAN  >60  >60  >60   EGFR IF NON-  >60  >60  >60   CALCIUM  9.4  9.5  9.3       CARDIAC BIOMARKERS:  Recent Labs   Lab  01/31/16   2336  02/01/16   0551  04/18/16   2208   TROPONIN I  0.006  <0.006  <0.006       COAGS:  Recent Labs   Lab  01/31/16   1730  04/18/16 2208   INR  1.0  1.0       LIPIDS/LFTS:  Recent Labs   Lab  12/09/15   1600  01/31/16   1730  02/01/16   0551  04/18/16   2208   CHOLESTEROL   --    --   211 H   --    TRIGLYCERIDES   --    --   149    --    HDL   --    --   46   --    LDL CHOLESTEROL   --    --   135.2   --    NON-HDL CHOLESTEROL   --    --   165   --    AST  24  28   --   23   ALT  32  30   --   31     Lipid panel from PCP 8/2017:    TG 67  HDL 45  LDL 73    LFT from PCP 5/2018:  AST 22  ALT 28    Cardiovascular Testing:  Echo 6/20/18    1 - Normal left ventricular systolic function (EF 60-65%).     2 - Concentric remodeling.     3 - Trivial to mild mitral regurgitation.     4 - Trivial to mild tricuspid regurgitation.     5 - Trivial pulmonic regurgitation.      Ex-MPI 12/21/15:  9min Javier 102% MPHR  Nuclear Quantitative Functional Analysis:   LVEF: >= 70 %  Impression: NORMAL MYOCARDIAL PERFUSION  1. The perfusion scan is free of evidence for myocardial ischemia or injury.   2. Resting wall motion is physiologic.   3. Resting LV function is normal.   4. The ventricular volumes are normal at rest and stress.   5. The extracardiac distribution of radioactivity is normal.     Cath 2/1/16  LVEDP (Pre): 7 mmHg  Ejection Fraction: 70%  C. ANGIOGRAPHIC RESULTS:  Patient has a right dominant coronary artery.   - Left Main Coronary Artery:  The LM is normal. There is HOUSTON 3 flow.  - Left Anterior Descending Artery:  The LAD has luminal irregularities. There is HOUSTON 3 flow.  - Left Circumflex Artery:  The LCX is normal. There is HOUSTON 3 flow.  - Right Coronary Artery:  The RCA is normal. There is HOUSTON 3 flow.  D. SUMMARY/POST-OPERATIVE DIAGNOSIS:  1. Recurrent CP with recent neg MPI.  2. Essentially normal coronary arteries.  3. Normal LV function.     ASSESSMENT:   # CP, resolved. Prior testing (including cardiac cath) normal 2016.  Echo 6/2018 normal.  Consider GI consult if sxs recur.  # HTN, controlled.   # HLP, on atorva 20mg  # DM, per IM  # h/o ASA allergy  # abnl EKG  # BMI 32     PLAN:   Cont med rx  Diet/exercise/weight loss  RTC prn  Next drug to add if BP not in range: amlod      Conrad España MD, FACC

## 2018-09-25 ENCOUNTER — HOSPITAL ENCOUNTER (EMERGENCY)
Facility: HOSPITAL | Age: 63
Discharge: HOME | End: 2018-09-25
Attending: EMERGENCY MEDICINE
Payer: OTHER GOVERNMENT

## 2018-09-25 ENCOUNTER — TELEPHONE (OUTPATIENT)
Dept: OTOLARYNGOLOGY | Facility: CLINIC | Age: 63
End: 2018-09-25

## 2018-09-25 VITALS
TEMPERATURE: 98 F | DIASTOLIC BLOOD PRESSURE: 69 MMHG | HEIGHT: 62 IN | OXYGEN SATURATION: 99 % | WEIGHT: 165 LBS | HEART RATE: 66 BPM | RESPIRATION RATE: 18 BRPM | SYSTOLIC BLOOD PRESSURE: 146 MMHG | BODY MASS INDEX: 30.36 KG/M2

## 2018-09-25 DIAGNOSIS — T18.9XXA FOREIGN BODY, SWALLOWED: Primary | ICD-10-CM

## 2018-09-25 DIAGNOSIS — R03.0 ELEVATED BLOOD PRESSURE READING: ICD-10-CM

## 2018-09-25 PROCEDURE — 99283 EMERGENCY DEPT VISIT LOW MDM: CPT

## 2018-09-25 NOTE — DISCHARGE INSTRUCTIONS
Follow up with their primary care physician.  Drink plenty of fluids.  Return to the emergency department for any changing or concerning symptoms.

## 2018-09-25 NOTE — ED PROVIDER NOTES
"Encounter Date: 9/25/2018    SCRIBE #1 NOTE: I, Stacy Diaz , am scribing for, and in the presence of,  Maki Huerta PA-C. I have scribed the following portions of the note - Other sections scribed: HPI, ROS .       History     Chief Complaint   Patient presents with    Swallowed Foreign Body     " well i called my doctor dr. Gonzalez, i was eating an a piece of plastic spoon when down my throat" " i been having anxiety attacks, i thought i passed it" swallowing a piece of plastic spoon 2 wks ago     CC: Swallowed Foreign Body     HPI: 62 y.o F who has a past medical history of Anxiety, Diabetes mellitus, Hypercholesteremia, Hypertension, and Hypothyroidism presents to the ED c/o swallowing a piece of a plastic spoon two weeks ago. Pt called her PCP this morning in attempts to make an appointment but he was unavailable so he directed her to the ED. Pt states she thinks the spoon has passed but has not seen it in her stool and is thus uncertain. She states she has anxiety b/c of this uncertainty. Pt reports minor throat irritation. She does admit that she could be "playing mind games" with herself.  She reports normal bowel movements with her last bowel movement being this morning. No pain or burning during urination, no emesis, nausea, fever, chest pains.       The history is provided by the patient. No  was used.     Review of patient's allergies indicates:   Allergen Reactions    Codeine Shortness Of Breath     Airway closed    Asa [aspirin]     Azithromycin     Benadryl [diphenhydramine hcl]     Iodinated contrast- oral and iv dye     Latex, natural rubber     Penicillins     Sulfa (sulfonamide antibiotics)     Vicodin [hydrocodone-acetaminophen]      Past Medical History:   Diagnosis Date    Anxiety     Diabetes mellitus     Hypercholesteremia     Hypertension     Hypothyroidism      Past Surgical History:   Procedure Laterality Date    CYST REMOVAL      HYSTERECTOMY      NE " REMOVAL OF OVARY/TUBE(S)      TONSILLECTOMY       Family History   Problem Relation Age of Onset    Hypertension Father     Diabetes Mother     Breast cancer Neg Hx     Colon cancer Neg Hx     Ovarian cancer Neg Hx      Social History     Tobacco Use    Smoking status: Never Smoker    Smokeless tobacco: Never Used   Substance Use Topics    Alcohol use: No    Drug use: No     Review of Systems   Constitutional: Negative for activity change, appetite change, chills, diaphoresis and fever.   HENT: Positive for trouble swallowing (Minor irritation swallowing ). Negative for congestion, drooling, ear pain, mouth sores, rhinorrhea, sinus pain and sore throat.    Eyes: Negative for pain and discharge.   Respiratory: Negative for cough, chest tightness, shortness of breath, wheezing and stridor.    Cardiovascular: Negative for chest pain, palpitations and leg swelling.   Gastrointestinal: Negative for abdominal distention, abdominal pain, blood in stool, constipation, diarrhea, nausea and vomiting.   Genitourinary: Negative for difficulty urinating, dysuria, flank pain, frequency, hematuria and urgency.   Musculoskeletal: Negative for arthralgias, back pain and myalgias.   Skin: Negative for pallor, rash and wound.   Neurological: Negative for dizziness, syncope, weakness, light-headedness and numbness.   Psychiatric/Behavioral: The patient is nervous/anxious.    All other systems reviewed and are negative.      Physical Exam     Initial Vitals [09/25/18 1642]   BP Pulse Resp Temp SpO2   (!) 171/79 71 20 98.9 °F (37.2 °C) 97 %      MAP       --         Physical Exam    Nursing note and vitals reviewed.  Constitutional: She appears well-developed and well-nourished. She is not diaphoretic. No distress.   HENT:   Head: Normocephalic and atraumatic.   Nose: Nose normal.   Eyes: EOM are normal. Pupils are equal, round, and reactive to light.   Neck: Normal range of motion. Neck supple.   Cardiovascular: Normal rate  and regular rhythm.   No murmur heard.  Pulmonary/Chest: Breath sounds normal. No respiratory distress. She has no wheezes. She has no rhonchi. She has no rales.   Abdominal: Soft. Bowel sounds are normal. She exhibits no distension. There is no tenderness. There is no rebound and no guarding.   Musculoskeletal: Normal range of motion.   Neurological: She is alert and oriented to person, place, and time. No cranial nerve deficit.   Skin: Skin is warm. No rash noted. No erythema.         ED Course   Procedures  Labs Reviewed - No data to display       Imaging Results          Xray Foreign Body Adult, Nose to Rectum (Final result)  Result time 09/25/18 17:38:54   Procedure changed from X-Ray Chest 1 View     Final result by Asa James III, MD (09/25/18 17:38:54)                 Impression:      See above    No acute process seen.      Electronically signed by: Asa James MD  Date:    09/25/2018  Time:    17:38             Narrative:    EXAMINATION:  XR FOREIGN BODY ADULT, NOSE TO RECTUM    CLINICAL HISTORY:  fb;  Foreign body of alimentary tract, part unspecified, initial encounter    FINDINGS:  Heart size is normal.  Lungs are clear.  The bones bowel gas noncontributory.                              X-Rays:   Independently Interpreted Readings:   Other Readings:  X-ray from the nose to the rectum revealed no foreign body.  No abnormalities were noted.    Medical Decision Making:   Differential Diagnosis:   This is an urgent evaluation of a 62-year-old female who presents to the emergency department concerned because she swallowed a spoon 2 weeks ago.  She is concerned that she has not yet passed the spoon.    The patient is currently afebrile and nontoxic in appearance.  Vital signs are stable. On physical exam, there is no abdominal tenderness, distention, rebound or guarding.  The throat is without erythema, exudate or tonsillar enlargement.  There is no tenderness to palpation of the neck.  The remaining  physical exam is unremarkable.  An x-ray of the nose to the rectum was performed which revealed no foreign body.  I doubt intestinal obstruction as the patient is eating and defecating without issue.  I believe this patient may have passed the spoon without noticing.  Her blood pressure was also elevated at today's visit.  She will need to follow up with her primary care physician.  Prior to discharge, her blood pressure was noted to be 146/69.  She will need to follow up with her primary care physician.  This was discussed with the patient who verbalized understanding and agreement.  She is currently safe and stable for discharge at this time.  This case was discussed with Dr. Swan and he is in agreement with the assessment and treatment plan.            Scribe Attestation:   Scribe #1: I performed the above scribed service and the documentation accurately describes the services I performed. I attest to the accuracy of the note.    Attending Attestation:           Physician Attestation for Scribe:  Physician Attestation Statement for Scribe #1: I, Maki Huerta PA-C, reviewed documentation, as scribed by Stacy Diaz  in my presence, and it is both accurate and complete.                    Clinical Impression:   The primary encounter diagnosis was Foreign body, swallowed. A diagnosis of Elevated blood pressure reading was also pertinent to this visit.      Disposition:   Disposition: Discharged  Condition: Stable                        María Huerta PA-C  09/25/18 1813       María Huerta PA-C  09/25/18 1817

## 2018-09-25 NOTE — TELEPHONE ENCOUNTER
----- Message from Lisa Koo sent at 9/25/2018 10:46 AM CDT -----  Contact: Self   Patient says she is having ear and throat pain and would like the doctor to check and see if she swallowed a plastic spoon. Please call at [phone].

## 2018-10-24 ENCOUNTER — TELEPHONE (OUTPATIENT)
Dept: OTOLARYNGOLOGY | Facility: CLINIC | Age: 63
End: 2018-10-24

## 2018-10-24 ENCOUNTER — CLINICAL SUPPORT (OUTPATIENT)
Dept: OTOLARYNGOLOGY | Facility: CLINIC | Age: 63
End: 2018-10-24
Payer: OTHER GOVERNMENT

## 2018-10-24 DIAGNOSIS — J32.9 SINUSITIS, UNSPECIFIED CHRONICITY, UNSPECIFIED LOCATION: ICD-10-CM

## 2018-10-24 DIAGNOSIS — J30.1 ALLERGIC RHINITIS DUE TO POLLEN, UNSPECIFIED SEASONALITY: Primary | ICD-10-CM

## 2018-10-24 DIAGNOSIS — J30.1 SEASONAL ALLERGIC RHINITIS DUE TO POLLEN: Primary | ICD-10-CM

## 2018-10-24 PROCEDURE — 95115 IMMUNOTHERAPY ONE INJECTION: CPT | Mod: S$GLB,,, | Performed by: OTOLARYNGOLOGY

## 2018-10-24 PROCEDURE — 99499 UNLISTED E&M SERVICE: CPT | Mod: S$GLB,,, | Performed by: OTOLARYNGOLOGY

## 2018-10-24 RX ORDER — CEFPROZIL 500 MG/1
500 TABLET, FILM COATED ORAL DAILY
Qty: 10 TABLET | Refills: 0 | Status: SHIPPED | OUTPATIENT
Start: 2018-10-24 | End: 2018-11-03

## 2018-10-24 NOTE — TELEPHONE ENCOUNTER
Pt. Walked in office today. Was seen in urgent care Sunday for sinus infection,fluid in left ear,PND,and sore throat. Was given doxycycline and  an injection ,but the doxy is making her vomite.  Unable to get an appt. With you can you order her something else.

## 2018-11-05 ENCOUNTER — OFFICE VISIT (OUTPATIENT)
Dept: OTOLARYNGOLOGY | Facility: CLINIC | Age: 63
End: 2018-11-05
Payer: OTHER GOVERNMENT

## 2018-11-05 VITALS
DIASTOLIC BLOOD PRESSURE: 84 MMHG | BODY MASS INDEX: 30 KG/M2 | HEIGHT: 62 IN | WEIGHT: 163 LBS | SYSTOLIC BLOOD PRESSURE: 158 MMHG

## 2018-11-05 DIAGNOSIS — J01.90 ACUTE BACTERIAL RHINOSINUSITIS: Primary | ICD-10-CM

## 2018-11-05 DIAGNOSIS — H61.23 BILATERAL IMPACTED CERUMEN: ICD-10-CM

## 2018-11-05 DIAGNOSIS — B96.89 ACUTE BACTERIAL RHINOSINUSITIS: Primary | ICD-10-CM

## 2018-11-05 DIAGNOSIS — J30.1 SEASONAL ALLERGIC RHINITIS DUE TO POLLEN: ICD-10-CM

## 2018-11-05 PROCEDURE — 69210 REMOVE IMPACTED EAR WAX UNI: CPT | Mod: S$GLB,,, | Performed by: OTOLARYNGOLOGY

## 2018-11-05 PROCEDURE — 99213 OFFICE O/P EST LOW 20 MIN: CPT | Mod: 25,S$GLB,, | Performed by: OTOLARYNGOLOGY

## 2018-11-05 RX ORDER — DOXYCYCLINE 100 MG/1
100 CAPSULE ORAL 2 TIMES DAILY
Qty: 20 CAPSULE | Refills: 0 | Status: SHIPPED | OUTPATIENT
Start: 2018-11-05 | End: 2018-11-15

## 2018-11-05 RX ORDER — AZELASTINE 1 MG/ML
SPRAY, METERED NASAL
Qty: 30 ML | Refills: 11 | Status: SHIPPED | OUTPATIENT
Start: 2018-11-05 | End: 2019-02-20 | Stop reason: SDUPTHER

## 2018-11-05 NOTE — PROGRESS NOTES
History of Present Illness:  Makeda Lynch presents to the clinic today for Ear Fullness and Cough  .  She is a 62-year-old female who has had a recent onset of sinusitis with purulent postnasal drainage.  She is also had ear congestion and complains of her ears being congested today.  She still has sinusitis symptoms with purulent nasal drainage.  She has had no fever or bronchitis.    Past Medical History:   Diagnosis Date    Anxiety     Diabetes mellitus     Hypercholesteremia     Hypertension     Hypothyroidism      Past Surgical History:   Procedure Laterality Date    CYST REMOVAL      HYSTERECTOMY      [place] REMOVAL OF OVARY/TUBE(S)      TONSILLECTOMY       Family History   Problem Relation Age of Onset    Hypertension Father     Diabetes Mother     Breast cancer Neg Hx     Colon cancer Neg Hx     Ovarian cancer Neg Hx        Social History     Tobacco Use    Smoking status: Never Smoker    Smokeless tobacco: Never Used   Substance Use Topics    Alcohol use: No    Drug use: No         Review of Systems   Ears: Positive for hearing loss.  Negative for ear pain, ear pressure, ringing in ear, ear discharge, ear infections and dizziness.    Nose:  Positive for nasal obstruction and postnasal drip.        Physical Exam:    Constitutional:   Vital signs are normal. She appears well-developed and well-nourished. She is active.     Head:  Normocephalic. Salivary glands normal.  Facial strength is normal.      Ears:    She had bilateral cerumen impactions which I removed with alligator forceps.  Her tympanic membranes were clear without middle ear effusion.  Her external auditory canals were clear without inflammation or infection.    Nose:    She has hypertrophic allergic nasal turbinates causing nasal obstruction.  There was purulent middle meatal drainage bilaterally.    Mouth/Throat  Oropharynx normal.     Neck:  Neck normal without thyromegaly masses, asymmetry, normal tracheal structure,  crepitus, and tenderness, thyroid normal, trachea normal and no adenopathy.          Assessment:   Makeda ANDERSON was seen today for ear fullness and cough.    Diagnoses and all orders for this visit:    Acute bacterial rhinosinusitis  -     doxycycline (VIBRAMYCIN) 100 MG Cap; Take 1 capsule (100 mg total) by mouth 2 (two) times daily. Take with meals. for 10 days    Seasonal allergic rhinitis due to pollen  -     azelastine (ASTELIN) 137 mcg (0.1 %) nasal spray; Use 2 sprays in each nostril nightly as needed.  Can be used twice a day    Bilateral impacted cerumen          Plan:   doxycycline 100 mg b.i.d. times 10 days and Astelin spray for nasal congestion.    Follow-up if symptoms worsen or fail to improve.

## 2018-11-05 NOTE — PATIENT INSTRUCTIONS
I ordered doxycycline antibiotic.  It covers chest and sinuses and ears.  Take it twice a day with food for 10 days.    Also ordered Astelin nasal spray.  Two sprays in your nose at night before you go to bed if you have congestion.  You can also use it in the morning if you need it.

## 2018-11-07 RX ORDER — RAMIPRIL 10 MG/1
20 CAPSULE ORAL DAILY
Qty: 180 CAPSULE | Refills: 3 | Status: SHIPPED | OUTPATIENT
Start: 2018-11-07

## 2018-12-06 ENCOUNTER — HOSPITAL ENCOUNTER (EMERGENCY)
Facility: HOSPITAL | Age: 63
Discharge: HOME | End: 2018-12-06
Attending: EMERGENCY MEDICINE
Payer: OTHER GOVERNMENT

## 2018-12-06 VITALS
DIASTOLIC BLOOD PRESSURE: 72 MMHG | OXYGEN SATURATION: 97 % | SYSTOLIC BLOOD PRESSURE: 124 MMHG | WEIGHT: 162 LBS | RESPIRATION RATE: 20 BRPM | BODY MASS INDEX: 29.81 KG/M2 | HEART RATE: 88 BPM | HEIGHT: 62 IN | TEMPERATURE: 98 F

## 2018-12-06 DIAGNOSIS — R19.7 DIARRHEA, UNSPECIFIED TYPE: ICD-10-CM

## 2018-12-06 DIAGNOSIS — R11.2 NAUSEA AND VOMITING, INTRACTABILITY OF VOMITING NOT SPECIFIED, UNSPECIFIED VOMITING TYPE: Primary | ICD-10-CM

## 2018-12-06 LAB
ALBUMIN SERPL BCP-MCNC: 4.8 G/DL
ALP SERPL-CCNC: 68 U/L
ALT SERPL W/O P-5'-P-CCNC: 44 U/L
ANION GAP SERPL CALC-SCNC: 14 MMOL/L
AST SERPL-CCNC: 33 U/L
BACTERIA #/AREA URNS HPF: NORMAL /HPF
BASOPHILS # BLD AUTO: 0 K/UL
BASOPHILS NFR BLD: 0 %
BILIRUB SERPL-MCNC: 0.7 MG/DL
BILIRUB UR QL STRIP: NEGATIVE
BUN SERPL-MCNC: 13 MG/DL
CALCIUM SERPL-MCNC: 10.1 MG/DL
CHLORIDE SERPL-SCNC: 101 MMOL/L
CLARITY UR: CLEAR
CO2 SERPL-SCNC: 25 MMOL/L
COLOR UR: COLORLESS
CREAT SERPL-MCNC: 0.8 MG/DL
DIFFERENTIAL METHOD: ABNORMAL
EOSINOPHIL # BLD AUTO: 0.1 K/UL
EOSINOPHIL NFR BLD: 1.1 %
ERYTHROCYTE [DISTWIDTH] IN BLOOD BY AUTOMATED COUNT: 14 %
EST. GFR  (AFRICAN AMERICAN): >60 ML/MIN/1.73 M^2
EST. GFR  (NON AFRICAN AMERICAN): >60 ML/MIN/1.73 M^2
GLUCOSE SERPL-MCNC: 120 MG/DL
GLUCOSE SERPL-MCNC: 122 MG/DL (ref 70–110)
GLUCOSE UR QL STRIP: NEGATIVE
HCT VFR BLD AUTO: 44.3 %
HGB BLD-MCNC: 14.5 G/DL
HGB UR QL STRIP: ABNORMAL
KETONES UR QL STRIP: NEGATIVE
LEUKOCYTE ESTERASE UR QL STRIP: NEGATIVE
LIPASE SERPL-CCNC: 30 U/L
LYMPHOCYTES # BLD AUTO: 1.2 K/UL
LYMPHOCYTES NFR BLD: 12.7 %
MCH RBC QN AUTO: 28.3 PG
MCHC RBC AUTO-ENTMCNC: 32.7 G/DL
MCV RBC AUTO: 86 FL
MICROSCOPIC COMMENT: NORMAL
MONOCYTES # BLD AUTO: 0.5 K/UL
MONOCYTES NFR BLD: 5.3 %
NEUTROPHILS # BLD AUTO: 7.4 K/UL
NEUTROPHILS NFR BLD: 80.9 %
NITRITE UR QL STRIP: NEGATIVE
PH UR STRIP: 6 [PH] (ref 5–8)
PLATELET # BLD AUTO: 230 K/UL
PMV BLD AUTO: 11.2 FL
POCT GLUCOSE: 122 MG/DL (ref 70–110)
POTASSIUM SERPL-SCNC: 3.9 MMOL/L
PROT SERPL-MCNC: 7.9 G/DL
PROT UR QL STRIP: NEGATIVE
RBC # BLD AUTO: 5.13 M/UL
RBC #/AREA URNS HPF: 1 /HPF (ref 0–4)
SODIUM SERPL-SCNC: 140 MMOL/L
SP GR UR STRIP: 1 (ref 1–1.03)
SQUAMOUS #/AREA URNS HPF: 4 /HPF
URN SPEC COLLECT METH UR: ABNORMAL
UROBILINOGEN UR STRIP-ACNC: NEGATIVE EU/DL
WBC # BLD AUTO: 9.11 K/UL

## 2018-12-06 PROCEDURE — 96374 THER/PROPH/DIAG INJ IV PUSH: CPT

## 2018-12-06 PROCEDURE — 82962 GLUCOSE BLOOD TEST: CPT

## 2018-12-06 PROCEDURE — 63600175 PHARM REV CODE 636 W HCPCS: Performed by: NURSE PRACTITIONER

## 2018-12-06 PROCEDURE — 96361 HYDRATE IV INFUSION ADD-ON: CPT

## 2018-12-06 PROCEDURE — 81000 URINALYSIS NONAUTO W/SCOPE: CPT

## 2018-12-06 PROCEDURE — 99284 EMERGENCY DEPT VISIT MOD MDM: CPT | Mod: 25

## 2018-12-06 PROCEDURE — 80053 COMPREHEN METABOLIC PANEL: CPT

## 2018-12-06 PROCEDURE — 25000003 PHARM REV CODE 250: Performed by: NURSE PRACTITIONER

## 2018-12-06 PROCEDURE — 83690 ASSAY OF LIPASE: CPT

## 2018-12-06 PROCEDURE — 85025 COMPLETE CBC W/AUTO DIFF WBC: CPT

## 2018-12-06 PROCEDURE — 96372 THER/PROPH/DIAG INJ SC/IM: CPT

## 2018-12-06 RX ORDER — DICYCLOMINE HYDROCHLORIDE 10 MG/ML
20 INJECTION INTRAMUSCULAR
Status: COMPLETED | OUTPATIENT
Start: 2018-12-06 | End: 2018-12-06

## 2018-12-06 RX ORDER — ONDANSETRON 4 MG/1
8 TABLET, ORALLY DISINTEGRATING ORAL EVERY 8 HOURS PRN
Qty: 15 TABLET | Refills: 0 | Status: SHIPPED | OUTPATIENT
Start: 2018-12-06 | End: 2019-02-20

## 2018-12-06 RX ORDER — ONDANSETRON 2 MG/ML
4 INJECTION INTRAMUSCULAR; INTRAVENOUS
Status: COMPLETED | OUTPATIENT
Start: 2018-12-06 | End: 2018-12-06

## 2018-12-06 RX ORDER — DICYCLOMINE HYDROCHLORIDE 20 MG/1
20 TABLET ORAL 3 TIMES DAILY
Qty: 20 TABLET | Refills: 0 | Status: SHIPPED | OUTPATIENT
Start: 2018-12-06 | End: 2019-01-05

## 2018-12-06 RX ADMIN — SODIUM CHLORIDE 1000 ML: 0.9 INJECTION, SOLUTION INTRAVENOUS at 10:12

## 2018-12-06 RX ADMIN — ONDANSETRON 4 MG: 2 INJECTION INTRAMUSCULAR; INTRAVENOUS at 10:12

## 2018-12-06 RX ADMIN — DICYCLOMINE HYDROCHLORIDE 20 MG: 20 INJECTION, SOLUTION INTRAMUSCULAR at 10:12

## 2018-12-06 NOTE — DISCHARGE INSTRUCTIONS
Please return to the ED for any new or worsening symptoms:  Severe pain, persistent vomiting, bloody diarrhea or any other concerns. Please follow up with primary care within in the week. You may also call [phone] for the [facility] same day appointment line.

## 2018-12-06 NOTE — ED TRIAGE NOTES
"Arrived via personal transportation. Pt reports nausea and generalized abd pain that started last night. Pt states "I feel bad. I felt really weak at work. I think it's the trulicity I take for my diabetes".  "

## 2018-12-06 NOTE — ED PROVIDER NOTES
"Encounter Date: 12/6/2018    SCRIBE #1 NOTE: IBerto, am scribing for, and in the presence of,  Keyonna Dwyer NP . I have scribed the following portions of the note - Other sections scribed: HPI, ROS .       History     Chief Complaint   Patient presents with    Abdominal Pain     nausea, emesis, diarrhea, and abdominal pain started last night; hx of DM    Nausea     CC: Abdominal Pain and Nausea      62 y.o. Female with a past medical history of Anxiety, Diabetes mellitus, Hypercholesteremia, Hypertension, and Hypothyroidism presents to ED c/o acute onset nausea, frequency, diarrhea, weakness, emesis, and generalized abdominal pain that began last night. Patient states she has had numerous episodes of diarrhea and abdominal "cramping." Patient reports experiencing symptoms after change in DM medication to Trulicity. She states this med change began 3 months ago, and she has intermittently experienced these symptoms ever since. Patient reports feeling as if she is about to "pass out." Patient reports she has had a normal appetite. Her PCP is Dr. Mann Shoemaker. Patient denies blood in stool, fever, and dysuria. No other associated symptoms.           The history is provided by the patient. No  was used.     Review of patient's allergies indicates:   Allergen Reactions    Codeine Shortness Of Breath     Airway closed    Asa [aspirin]     Azithromycin     Benadryl [diphenhydramine hcl]     Iodinated contrast- oral and iv dye     Latex, natural rubber     Penicillins     Sulfa (sulfonamide antibiotics)     Vicodin [hydrocodone-acetaminophen]      Past Medical History:   Diagnosis Date    Anxiety     Diabetes mellitus     Hypercholesteremia     Hypertension     Hypothyroidism      Past Surgical History:   Procedure Laterality Date    CYST REMOVAL      HYSTERECTOMY      [place] REMOVAL OF OVARY/TUBE(S)      TONSILLECTOMY       Family History   Problem Relation Age of " Onset    Hypertension Father     Diabetes Mother     Breast cancer Neg Hx     Colon cancer Neg Hx     Ovarian cancer Neg Hx      Social History     Tobacco Use    Smoking status: Never Smoker    Smokeless tobacco: Never Used   Substance Use Topics    Alcohol use: No    Drug use: No     Review of Systems   Constitutional: Negative for appetite change and fever.   HENT: Negative for rhinorrhea and sore throat.    Eyes: Negative for visual disturbance.   Respiratory: Negative for cough and shortness of breath.    Cardiovascular: Negative for chest pain.   Gastrointestinal: Positive for abdominal pain (generalized), diarrhea, nausea and vomiting. Negative for blood in stool.   Genitourinary: Positive for frequency. Negative for dysuria.   Musculoskeletal: Negative for gait problem.   Skin: Negative for rash.   Neurological: Positive for weakness and light-headedness. Negative for dizziness, seizures, syncope, numbness and headaches.       Physical Exam     Initial Vitals [12/06/18 0841]   BP Pulse Resp Temp SpO2   (!) 158/74 87 20 97.7 °F (36.5 °C) 100 %      MAP       --         Physical Exam    Constitutional: Vital signs are normal. She appears well-developed and well-nourished.  Non-toxic appearance.   Eyes: EOM are normal.   Neck: Full passive range of motion without pain. Neck supple. No neck rigidity.   Cardiovascular: Normal rate, S1 normal, S2 normal and normal heart sounds. Exam reveals no gallop.    No murmur heard.  Pulmonary/Chest: Effort normal and breath sounds normal. No tachypnea. She has no decreased breath sounds. She has no wheezes. She has no rhonchi. She has no rales.   Abdominal: Soft. Normal appearance. There is no tenderness. There is no CVA tenderness, no tenderness at McBurney's point and negative Raza's sign.   Neurological: She is alert and oriented to person, place, and time. She has normal strength. Gait normal. GCS eye subscore is 4. GCS verbal subscore is 5. GCS motor subscore  is 6.   Skin: Skin is warm and dry. No rash noted.         ED Course   Procedures  Labs Reviewed   URINALYSIS - Abnormal; Notable for the following components:       Result Value    Color, UA Colorless (*)     Occult Blood UA 1+ (*)     All other components within normal limits   CBC W/ AUTO DIFFERENTIAL - Abnormal; Notable for the following components:    Gran% 80.9 (*)     Lymph% 12.7 (*)     All other components within normal limits   COMPREHENSIVE METABOLIC PANEL - Abnormal; Notable for the following components:    Glucose 120 (*)     All other components within normal limits   POCT GLUCOSE - Abnormal; Notable for the following components:    POCT Glucose 122 (*)     All other components within normal limits   LIPASE   URINALYSIS MICROSCOPIC          Imaging Results    None          Medical Decision Making:   ED Management:  This is a 62-year-old female who presents to the ED with complaints of intermittent nausea, vomiting and diarrhea.  She is afebrile and well-appearing.  She attributes this to starting a new diabetes medication, Trulicity.  On exam, abdomen is soft and nontender. Her vital signs are normal. Laboratory evaluation including CBC, CMP, lipase and UA unremarkable. Glucose 122.  After receiving IV fluids, Zofran and Bentyl she reports feeling much better.  No vomiting or diarrhea while in the ED.  No evidence to suggest DKA, severe dehydration, acute pancreatitis, acute cholecystitis or other serious illness.  I do suspect this could be a side effect of her new medication.  Advised follow-up with primary care.  Discharged home with prescriptions for Zofran and Bentyl.  Instructions given for supportive care.  Return precautions given.            Scribe Attestation:   Scribe #1: I performed the above scribed service and the documentation accurately describes the services I performed. I attest to the accuracy of the note.    Attending Attestation:           Physician Attestation for  Scribe:  Physician Attestation Statement for Scribe #1: I, Keyonna Dwyer NP , reviewed documentation, as scribed by Berto White in my presence, and it is both accurate and complete.                    Clinical Impression:   The primary encounter diagnosis was Nausea and vomiting, intractability of vomiting not specified, unspecified vomiting type. A diagnosis of Diarrhea, unspecified type was also pertinent to this visit.      Disposition:   Disposition: Discharged  Condition: Stable                        Keyonna Dwyer NP  12/06/18 1071

## 2019-02-20 DIAGNOSIS — J30.1 SEASONAL ALLERGIC RHINITIS DUE TO POLLEN: ICD-10-CM

## 2019-02-20 RX ORDER — AZELASTINE 1 MG/ML
SPRAY, METERED NASAL
Qty: 30 ML | Refills: 11 | Status: SHIPPED | OUTPATIENT
Start: 2019-02-20

## 2019-03-04 PROBLEM — M81.0 SENILE OSTEOPOROSIS: Status: ACTIVE | Noted: 2019-03-04

## 2019-03-07 ENCOUNTER — TELEPHONE (OUTPATIENT)
Dept: OTOLARYNGOLOGY | Facility: CLINIC | Age: 64
End: 2019-03-07

## 2019-03-07 NOTE — TELEPHONE ENCOUNTER
Spoke with patient regarding allergy program and patient does not want to continue shots at this time. Will call us for any ENT needs.

## 2019-06-09 ENCOUNTER — HOSPITAL ENCOUNTER (EMERGENCY)
Facility: HOSPITAL | Age: 64
Discharge: HOME | End: 2019-06-09
Attending: EMERGENCY MEDICINE
Payer: OTHER GOVERNMENT

## 2019-06-09 VITALS
HEART RATE: 80 BPM | RESPIRATION RATE: 18 BRPM | BODY MASS INDEX: 30 KG/M2 | TEMPERATURE: 98 F | OXYGEN SATURATION: 100 % | SYSTOLIC BLOOD PRESSURE: 138 MMHG | HEIGHT: 62 IN | DIASTOLIC BLOOD PRESSURE: 70 MMHG | WEIGHT: 163 LBS

## 2019-06-09 DIAGNOSIS — R05.9 COUGH: ICD-10-CM

## 2019-06-09 DIAGNOSIS — J06.9 UPPER RESPIRATORY TRACT INFECTION, UNSPECIFIED TYPE: Primary | ICD-10-CM

## 2019-06-09 DIAGNOSIS — J01.90 ACUTE SINUSITIS, RECURRENCE NOT SPECIFIED, UNSPECIFIED LOCATION: ICD-10-CM

## 2019-06-09 LAB
DEPRECATED S PYO AG THROAT QL EIA: NEGATIVE
POCT GLUCOSE: 145 MG/DL (ref 70–110)

## 2019-06-09 PROCEDURE — 93010 EKG 12-LEAD: ICD-10-PCS | Mod: ,,, | Performed by: INTERNAL MEDICINE

## 2019-06-09 PROCEDURE — 99284 EMERGENCY DEPT VISIT MOD MDM: CPT | Mod: 25

## 2019-06-09 PROCEDURE — 93010 ELECTROCARDIOGRAM REPORT: CPT | Mod: ,,, | Performed by: INTERNAL MEDICINE

## 2019-06-09 PROCEDURE — 93005 ELECTROCARDIOGRAM TRACING: CPT

## 2019-06-09 PROCEDURE — 82962 GLUCOSE BLOOD TEST: CPT

## 2019-06-09 PROCEDURE — 87081 CULTURE SCREEN ONLY: CPT

## 2019-06-09 PROCEDURE — 87880 STREP A ASSAY W/OPTIC: CPT

## 2019-06-09 RX ORDER — DOXYCYCLINE 100 MG/1
100 CAPSULE ORAL 2 TIMES DAILY
Qty: 20 CAPSULE | Refills: 0 | Status: SHIPPED | OUTPATIENT
Start: 2019-06-09 | End: 2019-06-19

## 2019-06-09 RX ORDER — DOXYCYCLINE 100 MG/1
100 CAPSULE ORAL 2 TIMES DAILY
Qty: 20 CAPSULE | Refills: 0 | Status: SHIPPED | OUTPATIENT
Start: 2019-06-09 | End: 2019-06-09 | Stop reason: SDUPTHER

## 2019-06-09 NOTE — ED PROVIDER NOTES
Encounter Date: 6/9/2019    SCRIBE #1 NOTE: I, Wild Benitez, am scribing for, and in the presence of,  Dr. Traore. I have scribed the following portions of the note - Other sections scribed: HPI, ROS, PE, and ED Management .       History     Chief Complaint   Patient presents with    Nasal Congestion     Pt reports has been with productive cough for the past 5 days. Reports green mucous and pain with coughing. Also reports it really hurts to swallow. Denies fever/chills. Reports taking OTC (Tylenol Sinus, Tutu vapor) with no relief. Pt with hx of DM2, HTN, HLD.    Cough    Sore Throat     CC: cough    HPI:  This is a 63 y.o. Female, with a PMHx of diabetes mellitis, hyperlipoidemia and seasonal allergies, who presents to the Emergency Department with a cc of a cough productive of green phlegm starting 5 days ago. The patient reports associated sinus pain, sore throat, and voice change. She denies fever, congestion, and difficulty swallowing. There were no alleviating or worsening factors reported. She is currently taking the medications Trulicity and Metformin. Patient reports no prior history of similar symptoms. Patient does not smoke or drink.      The history is provided by the patient.     Review of patient's allergies indicates:   Allergen Reactions    Codeine Shortness Of Breath     Airway closed    Asa [aspirin]     Azithromycin     Benadryl [diphenhydramine hcl]     Iodinated contrast- oral and iv dye     Latex, natural rubber     Penicillins     Sulfa (sulfonamide antibiotics)     Vicodin [hydrocodone-acetaminophen]      Past Medical History:   Diagnosis Date    Anxiety     Diabetes mellitus     Hypercholesteremia     Hypertension     Hypothyroidism      Past Surgical History:   Procedure Laterality Date    CYST REMOVAL      HYSTERECTOMY      [place] REMOVAL OF OVARY/TUBE(S)      TONSILLECTOMY       Family History   Problem Relation Age of Onset    Hypertension Father     Diabetes  Mother     Breast cancer Neg Hx     Colon cancer Neg Hx     Ovarian cancer Neg Hx      Social History     Tobacco Use    Smoking status: Never Smoker    Smokeless tobacco: Never Used   Substance Use Topics    Alcohol use: No    Drug use: No     Review of Systems   Constitutional: Negative for fever.   HENT: Positive for sinus pain, sore throat and voice change. Negative for congestion and trouble swallowing.    Eyes: Negative for visual disturbance.   Respiratory: Positive for cough. Negative for shortness of breath.    Cardiovascular: Negative for chest pain.   Gastrointestinal: Negative for abdominal pain.   Genitourinary: Negative for dysuria.   Musculoskeletal: Negative for back pain.   Skin: Negative for rash.   Neurological: Negative for headaches.       Physical Exam     Initial Vitals [06/09/19 0540]   BP Pulse Resp Temp SpO2   (!) 142/67 76 20 98.1 °F (36.7 °C) 99 %      MAP       --         Physical Exam    Nursing note and vitals reviewed.  Constitutional: She appears well-developed and well-nourished.   HENT:   Head: Normocephalic and atraumatic.   Nose: Right sinus exhibits maxillary sinus tenderness and frontal sinus tenderness. Left sinus exhibits maxillary sinus tenderness and frontal sinus tenderness.   Eyes: EOM are normal. Pupils are equal, round, and reactive to light.   Neck: Normal range of motion. Neck supple. No thyromegaly present. No JVD present.   Cardiovascular: Normal rate and regular rhythm. Exam reveals no gallop and no friction rub.    No murmur heard.  Pulmonary/Chest: Breath sounds normal. No respiratory distress.   Abdominal: Soft. There is no tenderness.   Musculoskeletal: Normal range of motion. She exhibits no edema or tenderness.   Lymphadenopathy:     She has cervical adenopathy.   Anterior cervical lymphadenopathy    Neurological: She is alert and oriented to person, place, and time. She has normal strength. GCS score is 15. GCS eye subscore is 4. GCS verbal subscore  is 5. GCS motor subscore is 6.   Skin: Skin is warm and dry. Capillary refill takes less than 2 seconds.         ED Course   Procedures  Labs Reviewed   POCT GLUCOSE - Abnormal; Notable for the following components:       Result Value    POCT Glucose 145 (*)     All other components within normal limits   POCT GLUCOSE - Abnormal; Notable for the following components:    POCT Glucose 145 (*)     All other components within normal limits   THROAT SCREEN, RAPID   CULTURE, STREP A,  THROAT     EKG Readings: (Independently Interpreted)   Initial Reading: No STEMI. Rhythm: Normal Sinus Rhythm. Heart Rate: 74. Ectopy: No Ectopy. Conduction: Normal. ST Segments: Normal ST Segments. T Waves: Normal. Clinical Impression: Normal Sinus Rhythm     ECG Results          EKG 12-lead (Final result)  Result time 06/09/19 16:46:52    Final result by Interface, Lab In [facility] (06/09/19 16:46:52)                 Narrative:    Test Reason : R05,    Vent. Rate : 074 BPM     Atrial Rate : 074 BPM     P-R Int : 164 ms          QRS Dur : 078 ms      QT Int : 396 ms       P-R-T Axes : 047 022 069 degrees     QTc Int : 439 ms    Normal sinus rhythm  Normal ECG  When compared with ECG of 04-JUN-2018 08:06,  No significant change was found  Confirmed by Silver FLORES, Art JOSHI ([id number]) on 6/9/2019 4:46:43 PM    Referred By: [email or web address]   SELF           Confirmed By:Art Sheppard MD                             EKG 12-LEAD (Final result)  Result time 06/13/19 14:02:23    Final result by Unknown User (06/13/19 14:02:23)                                 EKG 12-LEAD (Final result)  Result time 06/18/19 10:58:24    Final result by Unknown User (06/18/19 10:58:24)                                Imaging Results          X-Ray Chest PA And Lateral (Final result)  Result time 06/09/19 06:37:23    Final result by Jaguar Martinez MD (06/09/19 06:37:23)                 Impression:      No radiographic evidence of acute intrathoracic process.      Electronically  signed by: Jaguar Martinez MD  Date:    06/09/2019  Time:    06:37             Narrative:    EXAMINATION:  XR CHEST PA AND LATERAL    CLINICAL HISTORY:  Cough    TECHNIQUE:  PA and lateral views of the chest were performed.    COMPARISON:  04/18/2016    FINDINGS:  The cardiomediastinal silhouette appears within normal limits.  The lungs are symmetrically aerated without evidence of focal airspace consolidation.  There is no pleural effusion or pneumothorax.  Visualized osseous structures appear intact.                              X-Rays:   Independently Interpreted Readings:   Other Readings:  Normal chest x-ray. Normal heart silhouette. No consolidation. No pulmonary embolism      Medical Decision Making:   Initial Assessment:   Makeda Lynch is a 63 y.o. female presenting for an emergent evaluation of cough productive of green phlegm, sinus pain, sore throat, and voice change x5 days. Exam revealed maxillary frontal sinus tenderness, frontal sinus tenderness, and anterior cervical lymphadenopathy. Will obtain labs, and given the extent of pain, imaging. I will treat the pt's symptoms appropriately and reassess.    Differential Diagnosis:   Differential Diagnosis includes, but is not limited to:  Viral URI, Strep pharyngitis, viral pharyngitis, foreign body aspiration/ingestion, bronchitis, asthma exacerbation, CHF exacerbation, COPD exacerbation, allergy/atopy, influenza, pertussis, PE, pneumonia, lung abscess, fungal infection, TB, epiglottitis.    Clinical Tests:   Radiological Study: Ordered and Reviewed  Medical Tests: Ordered and Reviewed  ED Management:  EKG Normal sinus rhythm, Rate 74. Chest X-ray benign.   After taking into careful account the historical factors and physical exam findings of the patient's presentation today, in conjunction with the empirical and objective data obtained on ED workup, no acute emergent medical condition requiring admission has been identified. The patient appears to be  low risk for an emergent medical condition and I feel it is safe and appropriate at this time for the patient to be discharged to follow-up as detailed in their discharge instructions for reevaluation and possible continued outpatient workup and management. I have discussed the specifics of the workup with the patient and the patient has verbalized understanding of the details of the workup, the diagnosis, the treatment plan, and the need for outpatient follow-up.  Although the patient has no emergent etiology today this does not preclude the development of an emergent condition so in addition, I have advised the patient that they can return to the ED and/or activate EMS at any time with worsening of their symptoms, change of their symptoms, or with any other medical complaint.  The patient remained comfortable and stable during their visit in the ED.  Discharge and follow-up instructions discussed with the patient who expressed understanding and willingness to comply with my recommendations.                         Clinical Impression:     1. Upper respiratory tract infection, unspecified type    2. Cough    3. Acute sinusitis, recurrence not specified, unspecified location            Disposition:   Disposition: Discharged  Condition: Stable          Scribe attestation: I, Elmer Traore, personally performed the services described in this documentation. All medical record entries made by the scribe were at my direction and in my presence.  I have reviewed the chart and agree that the record reflects my personal performance and is accurate and complete.                 Elmer Traore MD  06/22/19 5714

## 2019-06-10 LAB — POCT GLUCOSE: 145 MG/DL (ref 70–110)

## 2019-06-11 LAB — BACTERIA THROAT CULT: NORMAL

## 2019-07-09 ENCOUNTER — HOSPITAL ENCOUNTER (EMERGENCY)
Facility: HOSPITAL | Age: 64
Discharge: HOME | End: 2019-07-09
Attending: EMERGENCY MEDICINE
Payer: OTHER GOVERNMENT

## 2019-07-09 VITALS
WEIGHT: 165 LBS | HEART RATE: 63 BPM | TEMPERATURE: 99 F | OXYGEN SATURATION: 100 % | BODY MASS INDEX: 29.23 KG/M2 | DIASTOLIC BLOOD PRESSURE: 76 MMHG | SYSTOLIC BLOOD PRESSURE: 174 MMHG | HEIGHT: 63 IN | RESPIRATION RATE: 18 BRPM

## 2019-07-09 DIAGNOSIS — R07.89 CHEST WALL PAIN: Primary | ICD-10-CM

## 2019-07-09 DIAGNOSIS — R07.9 CHEST PAIN: ICD-10-CM

## 2019-07-09 LAB
ALBUMIN SERPL BCP-MCNC: 4.4 G/DL (ref 3.5–5.2)
ALP SERPL-CCNC: 72 U/L (ref 55–135)
ALT SERPL W/O P-5'-P-CCNC: 34 U/L (ref 10–44)
ANION GAP SERPL CALC-SCNC: 11 MMOL/L (ref 8–16)
AST SERPL-CCNC: 25 U/L (ref 10–40)
BASOPHILS # BLD AUTO: 0.02 K/UL (ref 0–0.2)
BASOPHILS NFR BLD: 0.4 % (ref 0–1.9)
BILIRUB SERPL-MCNC: 0.3 MG/DL (ref 0.1–1)
BILIRUB UR QL STRIP: NEGATIVE
BNP SERPL-MCNC: <10 PG/ML (ref 0–99)
BUN SERPL-MCNC: 15 MG/DL (ref 8–23)
CALCIUM SERPL-MCNC: 9.7 MG/DL (ref 8.7–10.5)
CHLORIDE SERPL-SCNC: 106 MMOL/L (ref 95–110)
CLARITY UR: CLEAR
CO2 SERPL-SCNC: 24 MMOL/L (ref 23–29)
COLOR UR: ABNORMAL
CREAT SERPL-MCNC: 0.8 MG/DL (ref 0.5–1.4)
DIFFERENTIAL METHOD: ABNORMAL
EOSINOPHIL # BLD AUTO: 0.2 K/UL (ref 0–0.5)
EOSINOPHIL NFR BLD: 3.3 % (ref 0–8)
ERYTHROCYTE [DISTWIDTH] IN BLOOD BY AUTOMATED COUNT: 14.3 % (ref 11.5–14.5)
EST. GFR  (AFRICAN AMERICAN): >60 ML/MIN/1.73 M^2
EST. GFR  (NON AFRICAN AMERICAN): >60 ML/MIN/1.73 M^2
GLUCOSE SERPL-MCNC: 127 MG/DL (ref 70–110)
GLUCOSE UR QL STRIP: NEGATIVE
HCT VFR BLD AUTO: 38 % (ref 37–48.5)
HGB BLD-MCNC: 12.3 G/DL (ref 12–16)
HGB UR QL STRIP: ABNORMAL
KETONES UR QL STRIP: NEGATIVE
LEUKOCYTE ESTERASE UR QL STRIP: ABNORMAL
LYMPHOCYTES # BLD AUTO: 2.7 K/UL (ref 1–4.8)
LYMPHOCYTES NFR BLD: 52.6 % (ref 18–48)
MCH RBC QN AUTO: 28.7 PG (ref 27–31)
MCHC RBC AUTO-ENTMCNC: 32.4 G/DL (ref 32–36)
MCV RBC AUTO: 89 FL (ref 82–98)
MICROSCOPIC COMMENT: NORMAL
MONOCYTES # BLD AUTO: 0.3 K/UL (ref 0.3–1)
MONOCYTES NFR BLD: 6.4 % (ref 4–15)
NEUTROPHILS # BLD AUTO: 1.9 K/UL (ref 1.8–7.7)
NEUTROPHILS NFR BLD: 37.5 % (ref 38–73)
NITRITE UR QL STRIP: NEGATIVE
PH UR STRIP: 5 [PH] (ref 5–8)
PLATELET # BLD AUTO: 191 K/UL (ref 150–350)
PMV BLD AUTO: 11.6 FL (ref 9.2–12.9)
POCT GLUCOSE: 138 MG/DL (ref 70–110)
POTASSIUM SERPL-SCNC: 4.1 MMOL/L (ref 3.5–5.1)
PROT SERPL-MCNC: 7.2 G/DL (ref 6–8.4)
PROT UR QL STRIP: NEGATIVE
RBC # BLD AUTO: 4.29 M/UL (ref 4–5.4)
RBC #/AREA URNS HPF: 0 /HPF (ref 0–4)
SODIUM SERPL-SCNC: 141 MMOL/L (ref 136–145)
SP GR UR STRIP: 1.01 (ref 1–1.03)
SQUAMOUS #/AREA URNS HPF: 4 /HPF
TROPONIN I SERPL DL<=0.01 NG/ML-MCNC: <0.006 NG/ML (ref 0–0.03)
URN SPEC COLLECT METH UR: ABNORMAL
UROBILINOGEN UR STRIP-ACNC: NEGATIVE EU/DL
WBC # BLD AUTO: 5.19 K/UL (ref 3.9–12.7)
WBC #/AREA URNS HPF: 3 /HPF (ref 0–5)

## 2019-07-09 PROCEDURE — 85025 COMPLETE CBC W/AUTO DIFF WBC: CPT

## 2019-07-09 PROCEDURE — 99285 EMERGENCY DEPT VISIT HI MDM: CPT | Mod: 25

## 2019-07-09 PROCEDURE — 81000 URINALYSIS NONAUTO W/SCOPE: CPT

## 2019-07-09 PROCEDURE — 84484 ASSAY OF TROPONIN QUANT: CPT

## 2019-07-09 PROCEDURE — 93005 ELECTROCARDIOGRAM TRACING: CPT

## 2019-07-09 PROCEDURE — 93010 EKG 12-LEAD: ICD-10-PCS | Mod: ,,, | Performed by: INTERNAL MEDICINE

## 2019-07-09 PROCEDURE — 83880 ASSAY OF NATRIURETIC PEPTIDE: CPT

## 2019-07-09 PROCEDURE — 93010 ELECTROCARDIOGRAM REPORT: CPT | Mod: ,,, | Performed by: INTERNAL MEDICINE

## 2019-07-09 PROCEDURE — 80053 COMPREHEN METABOLIC PANEL: CPT

## 2019-07-09 NOTE — ED TRIAGE NOTES
Pt reports midsternal chest pain since Wednesday.  Reports pain comes and goes and lasts approximately a minute when it comes on.  Describes pain as sharp and stabbing and with severity 9/10.  Denies taking medication for pain.  Denies sob.  Denies f/c/n/v.  Reports having diarrhea x3 this morning.  NAD.

## 2019-07-09 NOTE — ED PROVIDER NOTES
"Encounter Date: 7/9/2019    SCRIBE #1 NOTE: I, Wild Benitez, am scribing for, and in the presence of,  Elmer Traore MD. I have scribed the following portions of the note - Other sections scribed: HPI, ROS, PE, and ED Management .       History     Chief Complaint   Patient presents with    Chest Pain     midsternal chest pain since wednesday, hurts on and off. states no pain now, but 9/10 stabbing pain when it happens. reports sweating, denies n/v/d     CC: Chest Pain     HPI:  This is a 63 y.o. female, with a PMHx of GERD, HTN, DM, HLD, and Hypothyroidism, who presents to the Emergency Department with a cc of a 9/10, intermittent stabbing mid-sternal chest pain x6 days. The patient experiences episodes, lasting approximately 1 minute, at least 5 times daily. The patient reports associated diaphoresis and cough. She denies nausea, vomiting, or diarrhea. Symptoms are worsened by touching the area and lifting. She reportedly "stands still until it passes." Patient reports no prior history of similar symptoms. She has a FHx of heart problems. The patient doesn't smoke, consume alcohol, or use drugs.    The history is provided by the patient.     Review of patient's allergies indicates:   Allergen Reactions    Codeine Shortness Of Breath     Airway closed    Asa [aspirin]     Azithromycin     Benadryl [diphenhydramine hcl]     Iodinated contrast- oral and iv dye     Latex, natural rubber     Penicillins     Sulfa (sulfonamide antibiotics)     Vicodin [hydrocodone-acetaminophen]      Past Medical History:   Diagnosis Date    Anxiety     Diabetes mellitus     Hypercholesteremia     Hypertension     Hypothyroidism      Past Surgical History:   Procedure Laterality Date    CYST REMOVAL      HYSTERECTOMY      SD REMOVAL OF OVARY/TUBE(S)      TONSILLECTOMY       Family History   Problem Relation Age of Onset    Hypertension Father     Diabetes Mother     Breast cancer Neg Hx     Colon cancer Neg " Hx     Ovarian cancer Neg Hx      Social History     Tobacco Use    Smoking status: Never Smoker    Smokeless tobacco: Never Used   Substance Use Topics    Alcohol use: No    Drug use: No     Review of Systems   Constitutional: Positive for diaphoresis. Negative for fever.   HENT: Negative for sore throat.    Eyes: Negative for visual disturbance.   Respiratory: Positive for cough. Negative for shortness of breath.    Cardiovascular: Positive for chest pain.   Gastrointestinal: Negative for abdominal pain, diarrhea, nausea and vomiting.   Genitourinary: Negative for dysuria.   Musculoskeletal: Negative for back pain.   Skin: Negative for rash.   Neurological: Negative for headaches.   Psychiatric/Behavioral: Negative for confusion.       Physical Exam     Initial Vitals [07/09/19 1331]   BP Pulse Resp Temp SpO2   139/63 75 20 98.5 °F (36.9 °C) 99 %      MAP       --         Physical Exam    Nursing note and vitals reviewed.  Constitutional: She appears well-developed and well-nourished.   HENT:   Head: Normocephalic and atraumatic.   Eyes: EOM are normal. Pupils are equal, round, and reactive to light.   Neck: Normal range of motion. Neck supple. No thyromegaly present. No JVD present.   Cardiovascular: Normal rate and regular rhythm. Exam reveals no gallop and no friction rub.    No murmur heard.  Pulmonary/Chest: Breath sounds normal. No respiratory distress. She exhibits tenderness (Point reproducing ).   Abdominal: Soft. There is no tenderness.   Musculoskeletal: Normal range of motion. She exhibits no edema or tenderness.   Neurological: She is alert and oriented to person, place, and time. She has normal strength. GCS score is 15. GCS eye subscore is 4. GCS verbal subscore is 5. GCS motor subscore is 6.   Skin: Skin is warm and dry. Capillary refill takes less than 2 seconds.         ED Course   Procedures  Labs Reviewed   CBC W/ AUTO DIFFERENTIAL - Abnormal; Notable for the following components:        Result Value    Gran% 37.5 (*)     Lymph% 52.6 (*)     All other components within normal limits   COMPREHENSIVE METABOLIC PANEL - Abnormal; Notable for the following components:    Glucose 127 (*)     All other components within normal limits   URINALYSIS, REFLEX TO URINE CULTURE - Abnormal; Notable for the following components:    Occult Blood UA 1+ (*)     Leukocytes, UA Trace (*)     All other components within normal limits    Narrative:     Preferred Collection Type->Urine, Clean Catch   POCT GLUCOSE - Abnormal; Notable for the following components:    POCT Glucose 138 (*)     All other components within normal limits   TROPONIN I   B-TYPE NATRIURETIC PEPTIDE   URINALYSIS MICROSCOPIC    Narrative:     Preferred Collection Type->Urine, Clean Catch   TROPONIN I   POCT GLUCOSE MONITORING CONTINUOUS     EKG Readings: (Independently Interpreted)   Initial Reading: No STEMI. Rhythm: Normal Sinus Rhythm. Heart Rate: 73. Ectopy: No Ectopy. Conduction: Normal. ST Segments: Normal ST Segments. T Waves: Normal. Clinical Impression: Normal Sinus Rhythm       Imaging Results          X-Ray Chest PA And Lateral (Final result)  Result time 07/09/19 14:27:02    Final result by Jacquelin Alvares MD (07/09/19 14:27:02)                 Impression:      No acute abnormality.      Electronically signed by: Jacquelin Alvares MD  Date:    07/09/2019  Time:    14:27             Narrative:    EXAMINATION:  XR CHEST PA AND LATERAL    CLINICAL HISTORY:  Chest Pain;    TECHNIQUE:  PA and lateral views of the chest were performed.    COMPARISON:  06/09/2019    FINDINGS:  The lungs are clear, with normal appearance of pulmonary vasculature and no pleural effusion or pneumothorax.    The cardiac silhouette is normal in size. The hilar and mediastinal contours are unremarkable.    Bones are intact.                                 Medical Decision Making:   History:   Old Medical Records: I decided to obtain old medical  records.  Initial Assessment:   Makeda Lynch is a 63 y.o. female presenting for an emergent evaluation of chest pain. Exam revealed point reproducing chest wall tenderness. Will obtain labs, and given the extent of pain, imaging. I will treat the pt's symptoms appropriately and reassess.    Differential Diagnosis:   Differential Diagnosis includes, but is not limited to:  ACS/MI, PE, aortic dissection, pneumothorax, cardiac tamponade, pericarditis/myocarditis, pneumonia, infection/abscess, lung mass, trauma/fracture, costochondritis/pleurisy, MSK pain/contusion, GERD, biliary disease, pancreatitis, anemia    Clinical Tests:   Lab Tests: Ordered and Reviewed  Radiological Study: Ordered and Reviewed  Medical Tests: Ordered and Reviewed    Additional MDM:   Heart Score:    History:          Slightly suspicious.  ECG:             Normal  Age:               45-65 years  Risk factors: >= 3 risk factors or history of atherosclerotic disease  Troponin:       Less than or equal to normal limit  Final Score: 3       Patient has atypical chest pain.  It is sharp, localized lasts for only a minute and has no associated symptoms. Appears to be worse with repetitive lifting heavy objects at work while she was cooking.  Appears to be highly reproducible with point palpation to the superior mid sternal region.  EKG is normal.  Chest x-ray is normal.  Pain is been ongoing intermittently for 1 week.  Troponin is normal.  Heart score of 3.  Stable for discharge       Scribe Attestation:   Scribe #1: I performed the above scribed service and the documentation accurately describes the services I performed. I attest to the accuracy of the note.               Clinical Impression:     1. Chest wall pain    2. Chest pain            Disposition:   Disposition: Discharged  Condition: Stable    Scribe attestation: I, yg Traore, personally performed the services described in this documentation. All medical record entries made by the  scribe were at my direction and in my presence.  I have reviewed the chart and agree that the record reflects my personal performance and is accurate and complete.                      Elmer Traore MD  07/09/19 9547

## 2019-09-24 ENCOUNTER — HOSPITAL ENCOUNTER (EMERGENCY)
Facility: HOSPITAL | Age: 64
Discharge: HOME | End: 2019-09-24
Attending: EMERGENCY MEDICINE
Payer: OTHER GOVERNMENT

## 2019-09-24 VITALS
DIASTOLIC BLOOD PRESSURE: 73 MMHG | BODY MASS INDEX: 30.18 KG/M2 | WEIGHT: 164 LBS | OXYGEN SATURATION: 99 % | RESPIRATION RATE: 18 BRPM | TEMPERATURE: 98 F | SYSTOLIC BLOOD PRESSURE: 146 MMHG | HEIGHT: 62 IN | HEART RATE: 84 BPM

## 2019-09-24 DIAGNOSIS — N64.4 BREAST PAIN: Primary | ICD-10-CM

## 2019-09-24 DIAGNOSIS — N64.4 BREAST PAIN, LEFT: ICD-10-CM

## 2019-09-24 PROCEDURE — 99283 EMERGENCY DEPT VISIT LOW MDM: CPT | Mod: 25

## 2019-09-24 PROCEDURE — 93010 EKG 12-LEAD: ICD-10-PCS | Mod: ,,, | Performed by: INTERNAL MEDICINE

## 2019-09-24 PROCEDURE — 93010 ELECTROCARDIOGRAM REPORT: CPT | Mod: ,,, | Performed by: INTERNAL MEDICINE

## 2019-09-24 PROCEDURE — 93005 ELECTROCARDIOGRAM TRACING: CPT

## 2019-09-24 PROCEDURE — 25000003 PHARM REV CODE 250: Performed by: NURSE PRACTITIONER

## 2019-09-24 RX ORDER — ACETAMINOPHEN 325 MG/1
650 TABLET ORAL
Status: COMPLETED | OUTPATIENT
Start: 2019-09-24 | End: 2019-09-24

## 2019-09-24 RX ADMIN — ACETAMINOPHEN 650 MG: 325 TABLET, FILM COATED ORAL at 04:09

## 2019-09-24 NOTE — ED PROVIDER NOTES
"Encounter Date: 9/24/2019    SCRIBE #1 NOTE: I, Amanda Benitez, am scribing for, and in the presence of,  INOCENTE Porras. I have scribed the following portions of the note - Other sections scribed: HPI, ROS.       History     Chief Complaint   Patient presents with    Breast Pain     pt with bilateral breast pain. left breast for months. Had ultrasound done on left breast and mammogram done a couple of months ago. Pt now with right breast pain x 3 weeks. Denies fever. No redness or drainage to breast. pt states "I just want an ultrasound done just to be sure"      CC: Breast Pain    HPI: This 63 y.o female who has Hypertension, Hypercholesteremia, Diabetes mellitus, Hypothyroidism and Anxiety presents to the ED for an evaluation for left sided breast pain for the past 3 days.  Patient also reports noticing her left breast becoming larger than her right.  She reports she has been having similar pain to her left breast for the past year and reports the pain has unchanged.  She reports having an unremarkable ultrasound and mammogram done 8 months ago.  She reports being informed her breast pain is likely due to fatty tissue.  She endorses that she has been frequently palpating her left breast to check for possible masses.  She denies fever, chills, cough, rhinorrhea, nausea, emesis, chest pain, shortness of breath, or any other associated symptoms.  She reports she is under the care of a cardiologist and reports being informed during her last visit there were no abnormalities.  No prior tx.  No alleviating factors.    The history is provided by the patient.     Review of patient's allergies indicates:   Allergen Reactions    Asa [aspirin] Shortness Of Breath    Azithromycin Shortness Of Breath    Benadryl [diphenhydramine hcl] Shortness Of Breath    Codeine Shortness Of Breath     Airway closed    Latex, natural rubber Shortness Of Breath and Itching    Penicillins Shortness Of Breath, Itching and Swelling "    Sulfa (sulfonamide antibiotics) Shortness Of Breath and Swelling    Vicodin [hydrocodone-acetaminophen] Shortness Of Breath, Swelling and Hallucinations    Iodinated contrast media Swelling     Past Medical History:   Diagnosis Date    Anxiety     Diabetes mellitus     Hypercholesteremia     Hypertension     Hypothyroidism      Past Surgical History:   Procedure Laterality Date    CYST REMOVAL      HYSTERECTOMY      SC REMOVAL OF OVARY/TUBE(S)      SURGICAL REMOVAL OF MASS OF LOWER EXTREMITY Right     pt reported     TONSILLECTOMY       Family History   Problem Relation Age of Onset    Hypertension Father     Diabetes Mother     Breast cancer Neg Hx     Colon cancer Neg Hx     Ovarian cancer Neg Hx      Social History     Tobacco Use    Smoking status: Never Smoker    Smokeless tobacco: Never Used   Substance Use Topics    Alcohol use: No    Drug use: No     Review of Systems   Constitutional: Negative for chills and fever.   HENT: Negative for congestion, ear pain, rhinorrhea and sore throat.    Eyes: Negative for pain and visual disturbance.   Respiratory: Negative for cough and shortness of breath.    Cardiovascular: Negative for chest pain.   Gastrointestinal: Negative for abdominal pain, diarrhea, nausea and vomiting.   Genitourinary: Negative for dysuria.   Musculoskeletal: Negative for back pain and neck pain.        (+) left breast pain   Skin: Negative for rash.   Neurological: Negative for headaches.       Physical Exam     Initial Vitals [09/24/19 1529]   BP Pulse Resp Temp SpO2   (!) 144/70 85 18 97.8 °F (36.6 °C) 99 %      MAP       --         Physical Exam    Constitutional: She appears well-developed and well-nourished. She is not diaphoretic. No distress.   HENT:   Head: Normocephalic and atraumatic.   Neck: Normal range of motion.   Cardiovascular: Normal rate, regular rhythm, normal heart sounds and intact distal pulses. Exam reveals no gallop and no friction rub.    No  murmur heard.  Speaking in full and clear sentences without any dyspnea pause.   Pulmonary/Chest: Breath sounds normal. No respiratory distress. She exhibits no mass, no edema and no swelling. Right breast exhibits no inverted nipple, no mass, no nipple discharge, no skin change and no tenderness. Left breast exhibits no inverted nipple, no mass, no nipple discharge, no skin change and no tenderness.   No masses palpated on breast exam.  Normal breast tissue.   Abdominal: Soft. Bowel sounds are normal. There is no tenderness.   Musculoskeletal: Normal range of motion.   No swelling of the extremities.   Neurological: She is alert and oriented to person, place, and time.   Skin: Skin is warm and dry.   Psychiatric: She has a normal mood and affect. Her behavior is normal.         ED Course   Procedures  Labs Reviewed - No data to display  EKG Readings: (Independently Interpreted)   Initial Reading: No STEMI. Previous EKG: Compared with most recent EKG Previous EKG Date: 7/9/19. Rhythm: Normal Sinus Rhythm. Heart Rate: 85. Ectopy: No Ectopy. Conduction: Normal.       Imaging Results    None          Medical Decision Making:   ED Management:  This is a 63-year-old female presenting for evaluation of bilateral breast pain. Patient reports chronic left breast pain which has recently spread to the right breast.  No fever, chills, chest pain, shortness of breath, nausea, vomiting, fatigue.  She has been evaluated by her OBGYN for similar complaints. She had a normal ultrasound and mammogram and February.  On exam, she is well-appearing.  Normal breast exam.  No obvious mass or deformity.  No skin changes.  EKG with normal sinus rhythm without any acute process.  I doubt ACS as the cause.  Patient was reassured of normal findings on exam.  She will need outpatient imaging and evaluation.  She will follow up with her OBGYN tomorrow.            Scribe Attestation:   Scribe #1: I performed the above scribed service and the  documentation accurately describes the services I performed. I attest to the accuracy of the note.    Attending Attestation:   Physician Attestation Statement for Resident:  As the supervising MD  I agree with the above history. -:   As the supervising MD I agree with the above PE.    As the supervising MD I agree with the above treatment, course, plan, and disposition.   -: I have staffed the patient with the midlevel provider and was available for consultation in the department. I have guided the treatment plan and agree with the care provided.                         Clinical Impression:       ICD-10-CM ICD-9-CM   1. Breast pain N64.4 611.71   2. Breast pain, left N64.4 611.71         Disposition:   Disposition: Discharged  Condition: Stable    Scribe Attestation: I, SHONDA Cruz, personally performed the services described in this documentation. All medical record entries made by the scribe were at my direction and in my presence.  I have reviewed the chart and agree that the record reflects my personal performance and is accurate and complete.                    Nelida Cruz NP  09/24/19 9690       Dallas Fuller MD  09/24/19 [id number]

## 2019-09-24 NOTE — DISCHARGE INSTRUCTIONS
Please return to the Emergency Department for any new or worsening symptoms including: fever, chest pain, shortness of breath, loss of consciousness, dizziness, weakness, or any other concerns.     Please follow up with your Primary Care Provider within in the week. If you do not have one, you may contact the one listed on your discharge paperwork or you may also call the [facility] Appointment Desk at [phone] to schedule an appointment with one.

## 2019-09-24 NOTE — ED TRIAGE NOTES
The pt states she had her annual GYN exam, had a mammogram and ultrasound. Both exams came back normal. The pt is still experiencing left breast pain.

## 2019-12-23 ENCOUNTER — HOSPITAL ENCOUNTER (EMERGENCY)
Facility: HOSPITAL | Age: 64
Discharge: HOME | End: 2019-12-23
Attending: EMERGENCY MEDICINE
Payer: OTHER GOVERNMENT

## 2019-12-23 VITALS
RESPIRATION RATE: 20 BRPM | WEIGHT: 160 LBS | TEMPERATURE: 98 F | DIASTOLIC BLOOD PRESSURE: 60 MMHG | HEART RATE: 72 BPM | SYSTOLIC BLOOD PRESSURE: 132 MMHG | HEIGHT: 62 IN | BODY MASS INDEX: 29.44 KG/M2 | OXYGEN SATURATION: 96 %

## 2019-12-23 DIAGNOSIS — R11.2 NAUSEA AND VOMITING, INTRACTABILITY OF VOMITING NOT SPECIFIED, UNSPECIFIED VOMITING TYPE: Primary | ICD-10-CM

## 2019-12-23 DIAGNOSIS — R10.9 ABDOMINAL PAIN: ICD-10-CM

## 2019-12-23 DIAGNOSIS — E87.20 LACTIC ACIDOSIS: ICD-10-CM

## 2019-12-23 LAB
ALBUMIN SERPL BCP-MCNC: 4.3 G/DL (ref 3.5–5.2)
ALP SERPL-CCNC: 74 U/L (ref 55–135)
ALT SERPL W/O P-5'-P-CCNC: 46 U/L (ref 10–44)
ANION GAP SERPL CALC-SCNC: 12 MMOL/L (ref 8–16)
AST SERPL-CCNC: 32 U/L (ref 10–40)
BASOPHILS # BLD AUTO: 0.02 K/UL (ref 0–0.2)
BASOPHILS NFR BLD: 0.4 % (ref 0–1.9)
BILIRUB SERPL-MCNC: 0.4 MG/DL (ref 0.1–1)
BILIRUB UR QL STRIP: NEGATIVE
BUN SERPL-MCNC: 15 MG/DL (ref 8–23)
CALCIUM SERPL-MCNC: 9.1 MG/DL (ref 8.7–10.5)
CHLORIDE SERPL-SCNC: 105 MMOL/L (ref 95–110)
CLARITY UR: CLEAR
CO2 SERPL-SCNC: 24 MMOL/L (ref 23–29)
COLOR UR: COLORLESS
CREAT SERPL-MCNC: 0.8 MG/DL (ref 0.5–1.4)
CTP QC/QA: YES
DIFFERENTIAL METHOD: ABNORMAL
EOSINOPHIL # BLD AUTO: 0.1 K/UL (ref 0–0.5)
EOSINOPHIL NFR BLD: 2.2 % (ref 0–8)
ERYTHROCYTE [DISTWIDTH] IN BLOOD BY AUTOMATED COUNT: 13.8 % (ref 11.5–14.5)
EST. GFR  (AFRICAN AMERICAN): >60 ML/MIN/1.73 M^2
EST. GFR  (NON AFRICAN AMERICAN): >60 ML/MIN/1.73 M^2
GLUCOSE SERPL-MCNC: 224 MG/DL (ref 70–110)
GLUCOSE UR QL STRIP: NEGATIVE
HCT VFR BLD AUTO: 42.1 % (ref 37–48.5)
HGB BLD-MCNC: 13.3 G/DL (ref 12–16)
HGB UR QL STRIP: NEGATIVE
IMM GRANULOCYTES # BLD AUTO: 0.02 K/UL (ref 0–0.04)
IMM GRANULOCYTES NFR BLD AUTO: 0.4 % (ref 0–0.5)
KETONES UR QL STRIP: NEGATIVE
LACTATE SERPL-SCNC: 3.6 MMOL/L (ref 0.5–2.2)
LACTATE SERPL-SCNC: 3.7 MMOL/L (ref 0.5–2.2)
LEUKOCYTE ESTERASE UR QL STRIP: NEGATIVE
LIPASE SERPL-CCNC: 53 U/L (ref 4–60)
LYMPHOCYTES # BLD AUTO: 1.8 K/UL (ref 1–4.8)
LYMPHOCYTES NFR BLD: 32.6 % (ref 18–48)
MCH RBC QN AUTO: 27.8 PG (ref 27–31)
MCHC RBC AUTO-ENTMCNC: 31.6 G/DL (ref 32–36)
MCV RBC AUTO: 88 FL (ref 82–98)
MONOCYTES # BLD AUTO: 0.4 K/UL (ref 0.3–1)
MONOCYTES NFR BLD: 7.9 % (ref 4–15)
NEUTROPHILS # BLD AUTO: 3.1 K/UL (ref 1.8–7.7)
NEUTROPHILS NFR BLD: 56.5 % (ref 38–73)
NITRITE UR QL STRIP: NEGATIVE
NRBC BLD-RTO: 0 /100 WBC
PH UR STRIP: 5 [PH] (ref 5–8)
PLATELET # BLD AUTO: 187 K/UL (ref 150–350)
PMV BLD AUTO: 11.5 FL (ref 9.2–12.9)
POC MOLECULAR INFLUENZA A AGN: NEGATIVE
POC MOLECULAR INFLUENZA B AGN: NEGATIVE
POTASSIUM SERPL-SCNC: 4.3 MMOL/L (ref 3.5–5.1)
PROT SERPL-MCNC: 7.2 G/DL (ref 6–8.4)
PROT UR QL STRIP: NEGATIVE
RBC # BLD AUTO: 4.79 M/UL (ref 4–5.4)
SODIUM SERPL-SCNC: 141 MMOL/L (ref 136–145)
SP GR UR STRIP: 1 (ref 1–1.03)
TROPONIN I SERPL DL<=0.01 NG/ML-MCNC: <0.006 NG/ML (ref 0–0.03)
URN SPEC COLLECT METH UR: ABNORMAL
UROBILINOGEN UR STRIP-ACNC: NEGATIVE EU/DL
WBC # BLD AUTO: 5.46 K/UL (ref 3.9–12.7)

## 2019-12-23 PROCEDURE — 63600175 PHARM REV CODE 636 W HCPCS: Performed by: EMERGENCY MEDICINE

## 2019-12-23 PROCEDURE — 84484 ASSAY OF TROPONIN QUANT: CPT

## 2019-12-23 PROCEDURE — 87502 INFLUENZA DNA AMP PROBE: CPT

## 2019-12-23 PROCEDURE — 96361 HYDRATE IV INFUSION ADD-ON: CPT

## 2019-12-23 PROCEDURE — 96374 THER/PROPH/DIAG INJ IV PUSH: CPT

## 2019-12-23 PROCEDURE — 83690 ASSAY OF LIPASE: CPT

## 2019-12-23 PROCEDURE — 85025 COMPLETE CBC W/AUTO DIFF WBC: CPT

## 2019-12-23 PROCEDURE — 99285 EMERGENCY DEPT VISIT HI MDM: CPT | Mod: 25

## 2019-12-23 PROCEDURE — 83605 ASSAY OF LACTIC ACID: CPT

## 2019-12-23 PROCEDURE — 81003 URINALYSIS AUTO W/O SCOPE: CPT

## 2019-12-23 PROCEDURE — 80053 COMPREHEN METABOLIC PANEL: CPT

## 2019-12-23 RX ORDER — ONDANSETRON 2 MG/ML
8 INJECTION INTRAMUSCULAR; INTRAVENOUS
Status: COMPLETED | OUTPATIENT
Start: 2019-12-23 | End: 2019-12-23

## 2019-12-23 RX ADMIN — SODIUM CHLORIDE 1000 ML: 0.9 INJECTION, SOLUTION INTRAVENOUS at 08:12

## 2019-12-23 RX ADMIN — ONDANSETRON HYDROCHLORIDE 8 MG: 2 SOLUTION INTRAMUSCULAR; INTRAVENOUS at 07:12

## 2019-12-23 RX ADMIN — SODIUM CHLORIDE 1000 ML: 0.9 INJECTION, SOLUTION INTRAVENOUS at 06:12

## 2019-12-23 NOTE — ED TRIAGE NOTES
Pt arrived to ER w/ c/o abdominal pain (around navel)  x 3days w/ N/V/D  (2 days ago). Pt states that when she stands up she feels dizzy, Pt last meal was last night. She still feels nauseated but has not vomited. She has not taken anything to relieve her symptoms. Pt also reports a dry cough.  Pt denies fever and chills.

## 2019-12-23 NOTE — DISCHARGE INSTRUCTIONS
I spoke to her doctor's office and scheduled you an appointment for December 24th at 10:15 a.m. to re-evaluate him medications.    We have provided IV hydration.  If at any point in time you began to vomit again please return to the ER immediately.  Also return with any increased abdominal pain or any other concerns he might have.

## 2019-12-23 NOTE — ED PROVIDER NOTES
Encounter Date: 12/23/2019    SCRIBE #1 NOTE: I, Jessica Bell, am scribing for, and in the presence of,  Jennifer Gandhi MD. I have scribed the entire note.       History     Chief Complaint   Patient presents with    Abdominal Pain     Patient reports abdominal pain X 3 days. Rates pain 9/10 describes the sensation as cramping and burning. Patient also report N/V/D and weakness. Denie taking medication for relief.      CC: Abdominal pain    HPI: This is a 64 y.o. F who has HTN, DM, Hypercholesteremia, Hypothyroidism, and Anxiety who presents to the ED for emergent evaluation of acute abdominal pain that began 3 days ago. Pt has associated nausea, and diarrhea that consist of watery stool. She reports an episode of diaphoresis and feeling like she was going to pass out while making a bowel movement today. Diaphoresis and near syncope has resolved since arrival to the ED. She has a SHx of Hysterectomy. Pt takes Trulicity once per week and Metformin. Pt denies fever, chills, vomiting, or syncope.    The history is provided by the patient. No  was used.     Review of patient's allergies indicates:   Allergen Reactions    Asa [aspirin] Shortness Of Breath    Azithromycin Shortness Of Breath    Benadryl [diphenhydramine hcl] Shortness Of Breath    Codeine Shortness Of Breath     Airway closed    Latex, natural rubber Shortness Of Breath and Itching    Penicillins Shortness Of Breath, Itching and Swelling    Sulfa (sulfonamide antibiotics) Shortness Of Breath and Swelling    Vicodin [hydrocodone-acetaminophen] Shortness Of Breath, Swelling and Hallucinations    Iodinated contrast media Swelling     Past Medical History:   Diagnosis Date    Anxiety     Diabetes mellitus     Hypercholesteremia     Hypertension     Hypothyroidism      Past Surgical History:   Procedure Laterality Date    CYST REMOVAL      HYSTERECTOMY      ID REMOVAL OF OVARY/TUBE(S)      SURGICAL REMOVAL OF MASS OF  LOWER EXTREMITY Right     pt reported     TONSILLECTOMY       Family History   Problem Relation Age of Onset    Hypertension Father     Diabetes Mother     Breast cancer Neg Hx     Colon cancer Neg Hx     Ovarian cancer Neg Hx      Social History     Tobacco Use    Smoking status: Never Smoker    Smokeless tobacco: Never Used   Substance Use Topics    Alcohol use: No    Drug use: No     Review of Systems   Constitutional: Negative for chills and fever.   HENT: Negative for sore throat.    Respiratory: Negative for shortness of breath.    Cardiovascular: Negative for chest pain.   Gastrointestinal: Positive for abdominal pain, diarrhea and nausea. Negative for vomiting.   Genitourinary: Negative for dysuria.   Musculoskeletal: Negative for back pain.   Skin: Negative for rash.   Neurological: Negative for syncope and weakness.   Hematological: Does not bruise/bleed easily.       Physical Exam     Initial Vitals [12/23/19 0557]   BP Pulse Resp Temp SpO2   (!) 150/73 90 19 98.6 °F (37 °C) 99 %      MAP       --         Physical Exam    Nursing note and vitals reviewed.  Constitutional: She appears well-nourished. She does not appear ill.   HENT:   Head: Normocephalic.   Eyes: Conjunctivae are normal. No scleral icterus.   Neck: Normal range of motion. Neck supple.   Cardiovascular: Normal rate, regular rhythm, normal heart sounds and intact distal pulses.   No murmur heard.  Pulmonary/Chest: Breath sounds normal. No respiratory distress.   Abdominal: Soft. She exhibits no distension and no mass.   Diffuse tenderness on the right more so in the right lower quadrant.   Musculoskeletal: Normal range of motion. She exhibits no edema.   Neurological: She is alert.   Skin: Skin is warm and dry.   Psychiatric: She has a normal mood and affect. Her behavior is normal. Judgment and thought content normal.         ED Course   Procedures  Labs Reviewed   CBC W/ AUTO DIFFERENTIAL - Abnormal; Notable for the following  components:       Result Value    Mean Corpuscular Hemoglobin Conc 31.6 (*)     All other components within normal limits   COMPREHENSIVE METABOLIC PANEL - Abnormal; Notable for the following components:    Glucose 224 (*)     ALT 46 (*)     All other components within normal limits   LACTIC ACID, PLASMA - Abnormal; Notable for the following components:    Lactate (Lactic Acid) 3.6 (*)     All other components within normal limits    Narrative:     LACTIC ACID critical result(s) called and verbal readback obtained   from NABEEL LIEBERMAN by [facility] 12/23/2019 07:29   URINALYSIS, REFLEX TO URINE CULTURE - Abnormal; Notable for the following components:    Color, UA Colorless (*)     All other components within normal limits    Narrative:     Preferred Collection Type->Urine, Clean Catch   LACTIC ACID, PLASMA - Abnormal; Notable for the following components:    Lactate (Lactic Acid) 3.7 (*)     All other components within normal limits    Narrative:     After second bolus  LACTIC ACID critical result(s) called and verbal readback obtained   from TERRIE VALERIO by [facility] 12/23/2019 10:32   LIPASE   TROPONIN I   POCT INFLUENZA A/B MOLECULAR          Imaging Results          CT Abdomen Pelvis  Without Contrast (Final result)  Result time 12/23/19 08:34:41   Procedure changed from CT Abdomen Pelvis With Contrast     Final result by Edi Kearney MD (12/23/19 08:34:41)                 Impression:      1. No appendicitis or acute abnormality detected in the abdomen or pelvis.  2. Mild hepatomegaly with steatosis.  3. Status post hysterectomy and other findings as above.      Electronically signed by: Edi Kearney MD  Date:    12/23/2019  Time:    08:34             Narrative:    EXAMINATION:  CT ABDOMEN PELVIS WITHOUT CONTRAST    CLINICAL HISTORY:  RLQ pain, appendicitis suspected;    TECHNIQUE:  Low dose axial images, sagittal and coronal reformations were obtained from the lung bases to the pubic symphysis.  No contrast  given.    COMPARISON:  CT renal stone study 09/25/2014.    FINDINGS:  The visualized lung bases are clear without consolidation or pleural fluid.  A micronodule is suggested at the right lower lobe, unchanged.  No free air is seen in the abdomen.    spleen is normal.  Liver is mildly enlarged with decreased attenuation consistent with steatosis.  No definite focal liver lesion is identified on less sensitive, noncontrast exam.  The gallbladder, bile ducts, and pancreas are within normal limits.    Adrenal glands are normal.  Kidneys are normal size and show no stone, obstruction, or obvious mass.  Ureters are normal caliber.  Wall of the urinary bladder is smooth.  Uterus and ovaries are not identified.  Multiple pelvic calcifications are again seen consistent with phleboliths.    Abdominal aorta tapers normally with mild atherosclerosis.  No enlarged retroperitoneal lymph nodes are seen.    No significant ascites is detected in the abdomen or pelvis.  The intestinal tract shows no obstruction or acute inflammatory process.  Stomach is mostly collapsed.  Small bowel is normal caliber.  Normal appendix is visualized (coronal series 601, images 52-61).  Stool is seen throughout the colon.  A few diverticula are present in the left colon without acute inflammation.    Soft tissues of the abdominal wall are unremarkable.    Skeletal structures are intact without acute finding.  Age-appropriate degenerative changes are present in the spine.                                 Medical Decision Making:   Initial Assessment:   This is a 64 y.o. F who has HTN, DM, Hypercholesteremia, Hypothyroidism, and Anxiety who presents to the ED for abdominal pain that began 3 days ago with nausea.  Differential Diagnosis:   Differential includes appendicitis, cholecystitis, colitis, gastroenteritis, other infectious intra abdominal process, DKA/HHS with less suspicion for cardiac or pulmonary etiology.   Clinical Tests:   Lab Tests:  Ordered  Radiological Study: Ordered  Medical Tests: Ordered    Labs and imaging ordered. Labs with isolated elevated lactate. Negative trop x1 adequate as the symptoms started >24 hours ago. CT  also without acute finding. Pt received NS bolus and lactate repeated. Same as before, but specimen hemolyzed. Pt feels mush better. I have called her PCP office for follow up tomorrow at 10:15 to discuss meds. I feel that metformin may be causing the lactic acidosis. She will return to ED with any worsening of her condition, fevers, chills, nausea or vomiting or any oyther concerns.                   Scribe attestation: I, Jennifer Gandhi, personally performed the services described in this documentation. All medical record entries made by the scribe were at my direction and in my presence.  I have reviewed the chart and agree that the record reflects my personal performance and is accurate and complete.                     Clinical Impression:       ICD-10-CM ICD-9-CM   1. Nausea and vomiting, intractability of vomiting not specified, unspecified vomiting type R11.2 787.01   2. Abdominal pain R10.9 789.00   3. Lactic acidosis E87.2 276.2                             Jennifer Gandhi MD  12/23/19 3827

## 2019-12-23 NOTE — ED NOTES
Report received from Julio TOBIAS RN. Pt resting comfortably in bed, HOB elevated, pt remains on cardiac monitor, NIBP and pulse ox, pt rates pain as 8/10 at this time. Pt updated on plan of care and verbalized understanding, IV fluids infusing without difficulty, bed locked in lowest position, side rails up x2, call light within reach, will continue to monitor.

## 2023-12-02 ENCOUNTER — HOSPITAL ENCOUNTER (EMERGENCY)
Facility: HOSPITAL | Age: 68
Discharge: HOME | End: 2023-12-02
Attending: STUDENT IN AN ORGANIZED HEALTH CARE EDUCATION/TRAINING PROGRAM
Payer: OTHER GOVERNMENT

## 2023-12-02 VITALS
SYSTOLIC BLOOD PRESSURE: 164 MMHG | HEIGHT: 62 IN | WEIGHT: 160 LBS | OXYGEN SATURATION: 97 % | TEMPERATURE: 98 F | HEART RATE: 95 BPM | BODY MASS INDEX: 29.44 KG/M2 | RESPIRATION RATE: 20 BRPM | DIASTOLIC BLOOD PRESSURE: 74 MMHG

## 2023-12-02 DIAGNOSIS — T59.811A SMOKE INHALATION: Primary | ICD-10-CM

## 2023-12-02 DIAGNOSIS — R06.02 SHORTNESS OF BREATH: ICD-10-CM

## 2023-12-02 LAB
ALBUMIN SERPL BCP-MCNC: 4.1 G/DL (ref 3.5–5.2)
ALLENS TEST: ABNORMAL
ALP SERPL-CCNC: 64 U/L (ref 55–135)
ALT SERPL W/O P-5'-P-CCNC: 30 U/L (ref 10–44)
ANION GAP SERPL CALC-SCNC: 10 MMOL/L (ref 8–16)
AST SERPL-CCNC: 24 U/L (ref 10–40)
BASOPHILS # BLD AUTO: 0.01 K/UL (ref 0–0.2)
BASOPHILS NFR BLD: 0.2 % (ref 0–1.9)
BILIRUB SERPL-MCNC: 0.7 MG/DL (ref 0.1–1)
BNP SERPL-MCNC: <10 PG/ML (ref 0–99)
BUN SERPL-MCNC: 12 MG/DL (ref 8–23)
CALCIUM SERPL-MCNC: 9.6 MG/DL (ref 8.7–10.5)
CARBOXYHEMOGLOBIN: 1.3 % (ref 1.5–5)
CHLORIDE SERPL-SCNC: 109 MMOL/L (ref 95–110)
CO2 SERPL-SCNC: 19 MMOL/L (ref 23–29)
CREAT SERPL-MCNC: 0.8 MG/DL (ref 0.5–1.4)
DELSYS: ABNORMAL
DIFFERENTIAL METHOD: NORMAL
EOSINOPHIL # BLD AUTO: 0.1 K/UL (ref 0–0.5)
EOSINOPHIL NFR BLD: 1.8 % (ref 0–8)
ERYTHROCYTE [DISTWIDTH] IN BLOOD BY AUTOMATED COUNT: 13.3 % (ref 11.5–14.5)
EST. GFR  (NO RACE VARIABLE): >60 ML/MIN/1.73 M^2
GLUCOSE SERPL-MCNC: 222 MG/DL (ref 70–110)
HCO3 UR-SCNC: 21.8 MMOL/L (ref 24–28)
HCT VFR BLD AUTO: 38.2 % (ref 37–48.5)
HGB BLD-MCNC: 12.6 G/DL (ref 12–16)
IMM GRANULOCYTES # BLD AUTO: 0.01 K/UL (ref 0–0.04)
IMM GRANULOCYTES NFR BLD AUTO: 0.2 % (ref 0–0.5)
LYMPHOCYTES # BLD AUTO: 1.9 K/UL (ref 1–4.8)
LYMPHOCYTES NFR BLD: 43.2 % (ref 18–48)
MCH RBC QN AUTO: 28.4 PG (ref 27–31)
MCHC RBC AUTO-ENTMCNC: 33 G/DL (ref 32–36)
MCV RBC AUTO: 86 FL (ref 82–98)
MODE: ABNORMAL
MONOCYTES # BLD AUTO: 0.3 K/UL (ref 0.3–1)
MONOCYTES NFR BLD: 7.3 % (ref 4–15)
NEUTROPHILS # BLD AUTO: 2.1 K/UL (ref 1.8–7.7)
NEUTROPHILS NFR BLD: 47.3 % (ref 38–73)
NRBC BLD-RTO: 0 /100 WBC
PCO2 BLDA: 33.7 MMHG (ref 35–45)
PH SMN: 7.42 [PH] (ref 7.35–7.45)
PLATELET # BLD AUTO: 168 K/UL (ref 150–450)
PMV BLD AUTO: 11 FL (ref 9.2–12.9)
PO2 BLDA: 95 MMHG (ref 80–100)
POC BE: -2 MMOL/L
POC SATURATED O2: 98 % (ref 95–100)
POC TCO2: 23 MMOL/L (ref 23–27)
POTASSIUM SERPL-SCNC: 3.7 MMOL/L (ref 3.5–5.1)
PROT SERPL-MCNC: 7 G/DL (ref 6–8.4)
RBC # BLD AUTO: 4.44 M/UL (ref 4–5.4)
SAMPLE: ABNORMAL
SITE: ABNORMAL
SODIUM SERPL-SCNC: 138 MMOL/L (ref 136–145)
TROPONIN I SERPL DL<=0.01 NG/ML-MCNC: <0.006 NG/ML (ref 0–0.03)
WBC # BLD AUTO: 4.49 K/UL (ref 3.9–12.7)

## 2023-12-02 PROCEDURE — 93005 ELECTROCARDIOGRAM TRACING: CPT

## 2023-12-02 PROCEDURE — 82803 BLOOD GASES ANY COMBINATION: CPT

## 2023-12-02 PROCEDURE — 25000003 PHARM REV CODE 250: Performed by: STUDENT IN AN ORGANIZED HEALTH CARE EDUCATION/TRAINING PROGRAM

## 2023-12-02 PROCEDURE — 80053 COMPREHEN METABOLIC PANEL: CPT | Performed by: STUDENT IN AN ORGANIZED HEALTH CARE EDUCATION/TRAINING PROGRAM

## 2023-12-02 PROCEDURE — 82375 ASSAY CARBOXYHB QUANT: CPT

## 2023-12-02 PROCEDURE — 99900035 HC TECH TIME PER 15 MIN (STAT)

## 2023-12-02 PROCEDURE — 93010 EKG 12-LEAD: ICD-10-PCS | Mod: ,,, | Performed by: INTERNAL MEDICINE

## 2023-12-02 PROCEDURE — 84484 ASSAY OF TROPONIN QUANT: CPT | Performed by: STUDENT IN AN ORGANIZED HEALTH CARE EDUCATION/TRAINING PROGRAM

## 2023-12-02 PROCEDURE — 99285 EMERGENCY DEPT VISIT HI MDM: CPT | Mod: 25

## 2023-12-02 PROCEDURE — 85025 COMPLETE CBC W/AUTO DIFF WBC: CPT | Performed by: STUDENT IN AN ORGANIZED HEALTH CARE EDUCATION/TRAINING PROGRAM

## 2023-12-02 PROCEDURE — 93010 ELECTROCARDIOGRAM REPORT: CPT | Mod: ,,, | Performed by: INTERNAL MEDICINE

## 2023-12-02 PROCEDURE — 36600 WITHDRAWAL OF ARTERIAL BLOOD: CPT

## 2023-12-02 PROCEDURE — 82375 ASSAY CARBOXYHB QUANT: CPT | Performed by: STUDENT IN AN ORGANIZED HEALTH CARE EDUCATION/TRAINING PROGRAM

## 2023-12-02 PROCEDURE — 83880 ASSAY OF NATRIURETIC PEPTIDE: CPT | Performed by: STUDENT IN AN ORGANIZED HEALTH CARE EDUCATION/TRAINING PROGRAM

## 2023-12-02 RX ORDER — LORAZEPAM 0.5 MG/1
0.5 TABLET ORAL
Status: COMPLETED | OUTPATIENT
Start: 2023-12-02 | End: 2023-12-02

## 2023-12-02 RX ADMIN — LORAZEPAM 0.5 MG: 0.5 TABLET ORAL at 12:12

## 2023-12-02 NOTE — DISCHARGE INSTRUCTIONS
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
## 2023-12-05 LAB — COHGB MFR BLD: 2 %

## 2024-06-11 ENCOUNTER — HOSPITAL ENCOUNTER (EMERGENCY)
Facility: HOSPITAL | Age: 69
Discharge: HOME | End: 2024-06-11
Attending: EMERGENCY MEDICINE
Payer: MEDICARE

## 2024-06-11 VITALS
SYSTOLIC BLOOD PRESSURE: 168 MMHG | WEIGHT: 160 LBS | TEMPERATURE: 98 F | HEIGHT: 62 IN | HEART RATE: 80 BPM | OXYGEN SATURATION: 98 % | BODY MASS INDEX: 29.44 KG/M2 | DIASTOLIC BLOOD PRESSURE: 76 MMHG | RESPIRATION RATE: 17 BRPM

## 2024-06-11 DIAGNOSIS — R05.9 COUGH: ICD-10-CM

## 2024-06-11 DIAGNOSIS — Z77.098 CHEMICAL EXPOSURE: Primary | ICD-10-CM

## 2024-06-11 DIAGNOSIS — J39.2 THROAT IRRITATION: ICD-10-CM

## 2024-06-11 LAB
CTP QC/QA: YES
MOLECULAR STREP A: NEGATIVE
POC MOLECULAR INFLUENZA A AGN: NEGATIVE
POC MOLECULAR INFLUENZA B AGN: NEGATIVE
POCT GLUCOSE: 174 MG/DL (ref 70–110)
SARS-COV-2 RDRP RESP QL NAA+PROBE: NEGATIVE

## 2024-06-11 PROCEDURE — 87635 SARS-COV-2 COVID-19 AMP PRB: CPT | Performed by: PHYSICIAN ASSISTANT

## 2024-06-11 PROCEDURE — 96372 THER/PROPH/DIAG INJ SC/IM: CPT | Performed by: EMERGENCY MEDICINE

## 2024-06-11 PROCEDURE — 82962 GLUCOSE BLOOD TEST: CPT

## 2024-06-11 PROCEDURE — 63600175 PHARM REV CODE 636 W HCPCS: Performed by: EMERGENCY MEDICINE

## 2024-06-11 PROCEDURE — 87502 INFLUENZA DNA AMP PROBE: CPT

## 2024-06-11 PROCEDURE — 99284 EMERGENCY DEPT VISIT MOD MDM: CPT | Mod: 25

## 2024-06-11 RX ORDER — DEXAMETHASONE SODIUM PHOSPHATE 4 MG/ML
8 INJECTION, SOLUTION INTRA-ARTICULAR; INTRALESIONAL; INTRAMUSCULAR; INTRAVENOUS; SOFT TISSUE
Status: COMPLETED | OUTPATIENT
Start: 2024-06-11 | End: 2024-06-11

## 2024-06-11 RX ADMIN — DEXAMETHASONE SODIUM PHOSPHATE 8 MG: 4 INJECTION INTRA-ARTICULAR; INTRALESIONAL; INTRAMUSCULAR; INTRAVENOUS; SOFT TISSUE at 03:06

## 2024-06-11 NOTE — FIRST PROVIDER EVALUATION
" Emergency Department TeleTriage Encounter Note      CHIEF COMPLAINT    Chief Complaint   Patient presents with    COVID-19 Concerns     67 yo fem to triage for nasal congestion , runny nose, sore throat, cough, and ear pain since Friday. Pt states she cleaned her bathroom on Friday and inhaled "bleach" while cleaning up. Afterwards all of these symptoms started. /85 in triage.     Toxic Inhalation       VITAL SIGNS   Initial Vitals   BP Pulse Resp Temp SpO2   06/11/24 1147 06/11/24 1147 06/11/24 1147 06/11/24 1149 06/11/24 1147   (!) 203/85 90 17 98.3 °F (36.8 °C) 99 %      MAP       --                   ALLERGIES    Review of patient's allergies indicates:   Allergen Reactions    Asa [aspirin] Shortness Of Breath    Azithromycin Shortness Of Breath    Benadryl [diphenhydramine hcl] Shortness Of Breath    Codeine Shortness Of Breath     Airway closed    Latex, natural rubber Shortness Of Breath and Itching    Penicillins Shortness Of Breath, Itching and Swelling    Sulfa (sulfonamide antibiotics) Shortness Of Breath and Swelling    Vicodin [hydrocodone-acetaminophen] Shortness Of Breath, Swelling and Hallucinations    Iodinated contrast media Swelling       PROVIDER TRIAGE NOTE  Verbal consent for the teletriage evaluation was given by the patient at the start of the evaluation.  All efforts will be made to maintain patient's privacy during the evaluation.      This is a teletriage evaluation of a 68 y.o. female presenting to the ED with c/o nasal congestion, sore throat, ear pain; symptoms started 5 days ago.  Also worried cause inhaled bleach. Limited physical exam via telehealth: The patient is awake, alert, answering questions appropriately and is not in respiratory distress.  As the Teletriage provider, I performed an initial assessment and ordered appropriate labs and imaging studies, if any, to facilitate the patient's care once placed in the ED. Once a room is available, care and a full evaluation " will be completed by an alternate ED provider.  Any additional orders and the final disposition will be determined by that provider.  All imaging and labs will not be followed-up by the Teletriage Team, including myself.          ORDERS  Labs Reviewed   POCT GLUCOSE - Abnormal; Notable for the following components:       Result Value    POCT Glucose 174 (*)     All other components within normal limits   POCT INFLUENZA A/B MOLECULAR   SARS-COV-2 RDRP GENE   POCT STREP A MOLECULAR       ED Orders (720h ago, onward)      Start Ordered     Status Ordering Provider    06/11/24 1205 06/11/24 1204  POCT Influenza A/B Molecular  Once         Ordered NENITA ISRAELCOCO HUGO.    06/11/24 1205 06/11/24 1204  POCT COVID-19 Rapid Screening  Once         Ordered ISRAEL GUTIERRES.    06/11/24 1205 06/11/24 1204  POCT Strep A, Molecular  Once         Ordered NENITA ISRAEL JOYCE S.    06/11/24 1205 06/11/24 1204  X-Ray Chest AP Portable  1 time imaging         Ordered ISRAEL GUTIERRES.    06/11/24 1151 06/11/24 1151  POCT glucose  Once         Final result EMERGENCY, DEPT PHYSICIAN              Virtual Visit Note: The provider triage portion of this emergency department evaluation and documentation was performed via [email or web address], a HIPAA-compliant telemedicine application, in concert with a tele-presenter in the room. A face to face patient evaluation with one of my colleagues will occur once the patient is placed in an emergency department room.      DISCLAIMER: This note was prepared with [employer] voice recognition transcription software. Garbled syntax, mangled pronouns, and other bizarre constructions may be attributed to that software system.

## 2024-06-11 NOTE — ED NOTES
Makeda Lynch, a 68 y.o. female presents to the ED w/ complaint of sore throat, ear ache, and nasal congestion following an accidental inhalation of bleach while cleaning her bathroom on Friday. No other complaints at this time. Patient is AAOx3, VSS, NAD. Denies CP, SOB, N/V/D/C, fever, numbness, or tingling. Bed locked, in lowest position, bed rails up x2, all monitoring attached.

## 2024-06-11 NOTE — DISCHARGE INSTRUCTIONS
Please take medications as prescribed for throat discomfort.  Please return if you get worse or if new symptoms develop such as fever, chills, shortness of breath, chest pain.

## 2024-06-11 NOTE — ED PROVIDER NOTES
"Encounter Date: 6/11/2024       History     Chief Complaint   Patient presents with    COVID-19 Concerns     67 yo fem to triage for nasal congestion , runny nose, sore throat, cough, and ear pain since Friday. Pt states she cleaned her bathroom on Friday and inhaled "bleach" while cleaning up. Afterwards all of these symptoms started. /85 in triage.     Toxic Inhalation       Chief complaint:  Nasal irritation and throat irritation     History of present illness:  68-year-old  female with a past medical history of hypertension, hypercholesterolemia, hypothyroidism, diabetes mellitus, anxiety, presents emergency department for evaluation of throat irritation and nasal irritation after inhaling bleach chemical 5 days ago.  She denies shortness of   breath.  No wheezing.  No chest pain.  No vomiting.    No difficulty swallowing.      Review of patient's allergies indicates:   Allergen Reactions    Asa [aspirin] Shortness Of Breath    Azithromycin Shortness Of Breath    Benadryl [diphenhydramine hcl] Shortness Of Breath    Codeine Shortness Of Breath     Airway closed    Latex, natural rubber Shortness Of Breath and Itching    Penicillins Shortness Of Breath, Itching and Swelling    Sulfa (sulfonamide antibiotics) Shortness Of Breath and Swelling    Vicodin [hydrocodone-acetaminophen] Shortness Of Breath, Swelling and Hallucinations    Iodinated contrast media Swelling     Past Medical History:   Diagnosis Date    Anxiety     Diabetes mellitus     Hypercholesteremia     Hypertension     Hypothyroidism      Past Surgical History:   Procedure Laterality Date    CYST REMOVAL      HYSTERECTOMY      VT REMOVAL OF OVARY/TUBE(S)      SURGICAL REMOVAL OF MASS OF LOWER EXTREMITY Right     pt reported     TONSILLECTOMY       Family History   Problem Relation Name Age of Onset    Hypertension Father      Diabetes Mother      Breast cancer Neg Hx      Colon cancer Neg Hx      Ovarian cancer Neg Hx       Social History "     Tobacco Use    Smoking status: Never    Smokeless tobacco: Never   Substance Use Topics    Alcohol use: No    Drug use: No     Review of Systems   Constitutional:  Negative for fever.   HENT:  Positive for sore throat.    Respiratory:  Negative for shortness of breath.    Cardiovascular:  Negative for chest pain.   Gastrointestinal:  Negative for nausea.   Genitourinary:  Negative for dysuria.   Musculoskeletal:  Negative for back pain.   Skin:  Negative for rash.   Neurological:  Negative for weakness.   Hematological:  Does not bruise/bleed easily.       Physical Exam     Initial Vitals   BP Pulse Resp Temp SpO2   06/11/24 1147 06/11/24 1147 06/11/24 1147 06/11/24 1149 06/11/24 1147   (!) 203/85 90 17 98.3 °F (36.8 °C) 99 %      MAP       --                Physical Exam    Nursing note and vitals reviewed.  Constitutional: She appears well-developed and well-nourished. She is not diaphoretic. No distress.   HENT:   Head: Normocephalic and atraumatic.   Right Ear: External ear normal.   Left Ear: External ear normal.   Nose: Nose normal.   Mouth/Throat: Oropharynx is clear and moist.   Eyes: Conjunctivae and EOM are normal. Pupils are equal, round, and reactive to light. Right eye exhibits no discharge. Left eye exhibits no discharge. No scleral icterus.   Neck: Neck supple.   Normal range of motion.  Cardiovascular:  Normal rate, regular rhythm, normal heart sounds and intact distal pulses.           No murmur heard.  Pulmonary/Chest: Breath sounds normal. No respiratory distress. She has no wheezes. She has no rhonchi. She has no rales.     No respiratory distress.  Lungs clear of wheezes, rales, rhonchi.   Abdominal: Abdomen is soft. Bowel sounds are normal. She exhibits no distension and no mass. There is no abdominal tenderness. There is no rebound and no guarding.   Musculoskeletal:         General: No tenderness or edema. Normal range of motion.      Cervical back: Normal range of motion and neck supple.      Neurological: She is alert and oriented to person, place, and time. She has normal strength. No cranial nerve deficit or sensory deficit.   Skin: Skin is warm and dry. No rash noted. No erythema. No pallor.   Psychiatric: She has a normal mood and affect. Her behavior is normal. Judgment and thought content normal.         ED Course   Procedures  Labs Reviewed   POCT GLUCOSE - Abnormal; Notable for the following components:       Result Value    POCT Glucose 174 (*)     All other components within normal limits   POCT INFLUENZA A/B MOLECULAR   SARS-COV-2 RDRP GENE   POCT STREP A MOLECULAR     EKG Readings: (Independently Interpreted)   Initial Reading: No STEMI. Ectopy: No Ectopy.   EKG reviewed and interpreted by me shows   Sinus rhythm at a rate of 89 beats per minute.  The VA, QRS, QTC intervals are within normal limits there is normal axis.  There are no ST segment or T-wave ischemic findings.       Imaging Results              X-Ray Chest AP Portable (Final result)  Result time 06/11/24 14:32:54      Final result by Vinay Pal MD (06/11/24 14:32:54)                   Impression:      Ill-defined patchy opacity suggested at the medial right lung base/perihilar/infrahilar region, could relate to atelectasis, aspiration, or pneumonia.      Electronically signed by: Vinay Pal  Date:    06/11/2024  Time:    14:32               Narrative:    EXAMINATION:  XR CHEST AP PORTABLE    CLINICAL HISTORY:  Cough, unspecified    TECHNIQUE:  Single frontal view of the chest was performed.    COMPARISON:  Chest radiograph performed 12/02/2023, 11:47 hours.    FINDINGS:  Cardiomediastinal contours appear grossly unchanged.  Ill-defined patchy opacities suggested at the medial right lung base/perihilar/infrahilar region.    No definite pneumothorax or large volume pleural effusion.    No definite acute findings in the visualized abdomen.    Osseous and soft tissue structures appear without definite acute  change.                                    X-Rays:   Independently Interpreted Readings:   Other Readings:    Chest x-ray with nonspecific opacity in the perihilar region.    Medications   dexAMETHasone injection 8 mg (has no administration in time range)     Medical Decision Making    This is the emergent evaluation of a   68-year-old female presents emergency department for evaluation of nasal irritation and throat irritation after using chemicals to clean her home 5 days ago.  Differential diagnosis at the time of initial evaluation included, but was not limited to:   viral infection, pneumonia, throat irritation from chemical exposure.  Patient is not in respiratory distress.  There were no wheezes, rales, rhonchi on examination.  The patient is able to swallow.  There is no stridor or drooling.  She reports pain with swallowing/irritation.  She reports nasal irritation as well.  I offered to give the patient a dose of IM dexamethasone to help with inflammation.  I will prescribe Chloraseptic spray for her throat.  Chest x-ray with nonspecific finding in the right perihilar area.  I do not suspect pneumonia.  The patient is afebrile and nontoxic appearing and otherwise appears stable for discharge.  Advised return for new or worsening symptoms such as shortness of breath, fever, or chest pain.    Amount and/or Complexity of Data Reviewed  Labs: ordered. Decision-making details documented in ED Course.     Details:   COVID, strep, influenza negative.  Glucose 174.  Radiology: ordered and independent interpretation performed. Decision-making details documented in ED Course.  ECG/medicine tests: ordered and independent interpretation performed. Decision-making details documented in ED Course.    Risk  OTC drugs.  Prescription drug management.                                      Clinical Impression:  Final diagnoses:  [R05.9] Cough  [Z77.098] Chemical exposure (Primary)  [J39.2] Throat irritation          ED  Disposition Condition    Discharge Stable          ED Prescriptions       Medication Sig Dispense Start Date End Date Auth. Provider    phenoL (CHLORASEPTIC) 1.4 % SprA by Mucous Membrane route every 2 (two) hours as needed (throat irritation). 177 mL 6/11/2024 -- David Florentino Jr., MD          Follow-up Information       Follow up With Specialties Details Why Contact Info    Mann Shoemaker MD Family Medicine Call in 2 days  [address] [facility] [id number] [phone]               David Florentino Jr., MD  06/11/24 1522

## 2024-06-13 LAB
OHS QRS DURATION: 78 MS
OHS QTC CALCULATION: 428 MS

## 2024-06-16 ENCOUNTER — HOSPITAL ENCOUNTER (EMERGENCY)
Facility: HOSPITAL | Age: 69
Discharge: HOME | End: 2024-06-16
Attending: EMERGENCY MEDICINE
Payer: MEDICARE

## 2024-06-16 VITALS
TEMPERATURE: 98 F | RESPIRATION RATE: 18 BRPM | BODY MASS INDEX: 28.16 KG/M2 | WEIGHT: 153 LBS | HEIGHT: 62 IN | DIASTOLIC BLOOD PRESSURE: 84 MMHG | SYSTOLIC BLOOD PRESSURE: 178 MMHG | HEART RATE: 80 BPM | OXYGEN SATURATION: 98 %

## 2024-06-16 DIAGNOSIS — J45.21 MILD INTERMITTENT ASTHMA WITH ACUTE EXACERBATION: Primary | ICD-10-CM

## 2024-06-16 DIAGNOSIS — R06.02 SHORTNESS OF BREATH: ICD-10-CM

## 2024-06-16 DIAGNOSIS — Z87.01 HISTORY OF PNEUMONIA: ICD-10-CM

## 2024-06-16 DIAGNOSIS — R05.1 ACUTE COUGH: ICD-10-CM

## 2024-06-16 PROCEDURE — 94640 AIRWAY INHALATION TREATMENT: CPT

## 2024-06-16 PROCEDURE — 99284 EMERGENCY DEPT VISIT MOD MDM: CPT | Mod: 25

## 2024-06-16 PROCEDURE — 25000242 PHARM REV CODE 250 ALT 637 W/ HCPCS

## 2024-06-16 PROCEDURE — 93010 ELECTROCARDIOGRAM REPORT: CPT | Mod: ,,, | Performed by: INTERNAL MEDICINE

## 2024-06-16 PROCEDURE — 93005 ELECTROCARDIOGRAM TRACING: CPT

## 2024-06-16 RX ORDER — IPRATROPIUM BROMIDE AND ALBUTEROL SULFATE 2.5; .5 MG/3ML; MG/3ML
3 SOLUTION RESPIRATORY (INHALATION) ONCE
Status: COMPLETED | OUTPATIENT
Start: 2024-06-16 | End: 2024-06-16

## 2024-06-16 RX ORDER — PROMETHAZINE HYDROCHLORIDE AND DEXTROMETHORPHAN HYDROBROMIDE 6.25; 15 MG/5ML; MG/5ML
5 SYRUP ORAL EVERY 4 HOURS PRN
Qty: 180 ML | Refills: 0 | Status: SHIPPED | OUTPATIENT
Start: 2024-06-16

## 2024-06-16 RX ORDER — CETIRIZINE HYDROCHLORIDE 10 MG/1
10 TABLET ORAL DAILY PRN
Qty: 30 TABLET | Refills: 0 | Status: SHIPPED | OUTPATIENT
Start: 2024-06-16

## 2024-06-16 RX ORDER — FLUTICASONE PROPIONATE 50 MCG
1 SPRAY, SUSPENSION (ML) NASAL 2 TIMES DAILY PRN
Qty: 15 G | Refills: 0 | Status: SHIPPED | OUTPATIENT
Start: 2024-06-16

## 2024-06-16 RX ORDER — ALBUTEROL SULFATE 90 UG/1
1-2 AEROSOL, METERED RESPIRATORY (INHALATION) EVERY 6 HOURS PRN
Qty: 8 G | Refills: 1 | Status: SHIPPED | OUTPATIENT
Start: 2024-06-16

## 2024-06-16 RX ADMIN — IPRATROPIUM BROMIDE AND ALBUTEROL SULFATE 3 ML: 2.5; .5 SOLUTION RESPIRATORY (INHALATION) at 11:06

## 2024-06-16 NOTE — ED TRIAGE NOTES
Patient reports when at [employer] today had an asthma attack, is being treated for PN on ABX, not currently SOB, slight wheeze noted

## 2024-06-16 NOTE — DISCHARGE INSTRUCTIONS

## 2024-06-16 NOTE — ED PROVIDER NOTES
Encounter Date: 6/16/2024    SCRIBE #1 NOTE: I, Leila Tucker, am scribing for, and in the presence of,  Holdsworth, Alayna, PA-C. I have scribed the following portions of the note - Other sections scribed: HPI, ROS.       History     Chief Complaint   Patient presents with    Asthma     Pt states she had an asthma attack while at [employer] this morning. Pt states she did not have an inhaler and was unable to stop coughing. Pt states all symptoms have subsided but wants to get checked. Denies cp, sob, n/v/d     68-year-old female with a past medical history of DM, HTN, Hypothyroidism, presents to the ED with Asthma Flare-Up, symptoms onset today. Patient reports rhinorrhea, intermittent and productive cough, and headache. Patient states while at [employer] today she had an asthma attack, has a Hx of asthma but hasn't had a flare up in many years so she did not have an inhaler with her to stop the coughing. Patient also states she was seen here on 6/11 where she was diagnosed with pneumonia and later followed up with her PCP where she was prescribed Levaquin (taking only half a pill due to it causing abdominal pain even after eating prior to taking ABX). Patient further states that her CBG was 116 when taken at home but due to a Trulicty shortage she has been in the process of getting a new medication. Patient denies sick contact. Patient denies vaginal discharge, fever, congestion, SOB, chest pain, chest tightness, wheezing, myalgia, dizziness, lightheadedness, nausea, emesis, or other associated symptoms. No other alleviating or exacerbating factors. This is the extent of the patient's complaints in the ED.                    The history is provided by the patient. No  was used.     Review of patient's allergies indicates:   Allergen Reactions    Asa [aspirin] Shortness Of Breath    Azithromycin Shortness Of Breath    Benadryl [diphenhydramine hcl] Shortness Of Breath    Codeine Shortness Of Breath      Airway closed    Latex, natural rubber Shortness Of Breath and Itching    Penicillins Shortness Of Breath, Itching and Swelling    Sulfa (sulfonamide antibiotics) Shortness Of Breath and Swelling    Vicodin [hydrocodone-acetaminophen] Shortness Of Breath, Swelling and Hallucinations    Iodinated contrast media Swelling     Past Medical History:   Diagnosis Date    Anxiety     Diabetes mellitus     Hypercholesteremia     Hypertension     Hypothyroidism      Past Surgical History:   Procedure Laterality Date    CYST REMOVAL      HYSTERECTOMY      GA REMOVAL OF OVARY/TUBE(S)      SURGICAL REMOVAL OF MASS OF LOWER EXTREMITY Right     pt reported     TONSILLECTOMY       Family History   Problem Relation Name Age of Onset    Hypertension Father      Diabetes Mother      Breast cancer Neg Hx      Colon cancer Neg Hx      Ovarian cancer Neg Hx       Social History     Tobacco Use    Smoking status: Never    Smokeless tobacco: Never   Substance Use Topics    Alcohol use: No    Drug use: No     Review of Systems   Constitutional:  Negative for chills, diaphoresis and fever.   HENT:  Positive for rhinorrhea. Negative for congestion.    Respiratory:  Positive for cough. Negative for chest tightness, shortness of breath (Resolved) and wheezing (Resolved).    Cardiovascular:  Negative for chest pain and leg swelling.   Gastrointestinal:  Negative for abdominal pain, constipation, diarrhea, nausea and vomiting.   Genitourinary:  Negative for decreased urine volume, dysuria, frequency and vaginal discharge.   Musculoskeletal:  Negative for arthralgias and myalgias.   Skin:  Negative for rash.   Neurological:  Positive for headaches. Negative for dizziness, weakness and light-headedness.       Physical Exam     Initial Vitals [06/16/24 1134]   BP Pulse Resp Temp SpO2   (!) 178/84 87 16 98.4 °F (36.9 °C) 98 %      MAP       --         Physical Exam    Nursing note and vitals reviewed.  Constitutional: She appears well-developed and  well-nourished. She is not diaphoretic. She is active. No distress.   HENT:   Head: Normocephalic and atraumatic.   Right Ear: External ear normal.   Left Ear: External ear normal.   Nose: Nose normal.   Mouth/Throat: Uvula is midline, oropharynx is clear and moist and mucous membranes are normal.   Eyes: Conjunctivae, EOM and lids are normal. Pupils are equal, round, and reactive to light. Right eye exhibits no discharge. Left eye exhibits no discharge.   Neck: Phonation normal. Neck supple. No stridor present.   Normal range of motion.   Full passive range of motion without pain.     Cardiovascular:  Normal rate and regular rhythm.           Pulmonary/Chest: Effort normal and breath sounds normal. No respiratory distress.   Speaking in full sentences   Abdominal: She exhibits no distension.   Musculoskeletal:         General: Normal range of motion.      Cervical back: Full passive range of motion without pain, normal range of motion and neck supple.      Right lower leg: Normal.      Left lower leg: Normal.     Neurological: She is alert and oriented to person, place, and time. She has normal strength. No sensory deficit. GCS eye subscore is 4. GCS verbal subscore is 5. GCS motor subscore is 6.   Skin: Skin is dry. Capillary refill takes less than 2 seconds.         ED Course   Procedures  Labs Reviewed - No data to display       Imaging Results              X-Ray Chest PA And Lateral (Final result)  Result time 06/16/24 12:17:35      Final result by Vinay Pal MD (06/16/24 12:17:35)                   Impression:      No convincing evidence of acute cardiopulmonary disease.      Electronically signed by: Vinay Pal  Date:    06/16/2024  Time:    12:17               Narrative:    EXAMINATION:  XR CHEST PA AND LATERAL    CLINICAL HISTORY:  Unspecified asthma, uncomplicated    TECHNIQUE:  PA and lateral views of the chest were performed.    COMPARISON:  Chest radiograph performed 06/11/2024, 14:01  hours.    FINDINGS:  Cardiomediastinal contours appear to be within normal limits.  Atherosclerosis of the aorta.    Lungs essentially clear.    No definite pneumothorax or large volume pleural effusion.    No acute findings in the visualized abdomen.    Osseous and soft tissue structures appear without definite acute abnormality                                       Medications   albuterol-ipratropium 2.5 mg-0.5 mg/3 mL nebulizer solution 3 mL (3 mLs Nebulization Given 6/16/24 3810)     Medical Decision Making  68-year-old female with a past medical history of DM, HTN, Hypothyroidism, presents to the ED with Asthma Flare-Up, symptoms onset today.   Patient's chart and medical history reviewed.    Ddx:  Asthma  Pneumonia  Pleural effusion  Pneumothorax  PE    Patient's vitals reviewed.  Afebrile, no respiratory distress, and nontoxic-appearing in the ED. patient's physical exam is overall unremarkable.  Patient is speaking in full sentences no respiratory distress.  90% O2 on room air.  Patient is ambulatory O2 saturation is 98%%.  Patient given a breathing treatment. EKG showed normal sinus rhythm with 82 bpm. CO interval 168 ms. QRS 78 ms.  ms. No stemi noted.  Normal EKG.  Signed by Dr. Mclaughlin. Chest x-ray was unremarkable per my personal interpretation. Official chest x-ray interpretation showed no acute abnormality.  Patient states she is feeling better.  Discussed patient assessment likely acute asthma exacerbation.  Considered but unlikely PE, no shortness a breath, no calf swelling, no tachycardia, and overall well-appearing.  No evidence of pneumonia, pleural effusion, or pneumothorax on chest x-ray.  Patient will be sent refills on her inhalers.  Patient will also be sent home with promethazine DM cough syrup, Flonase, and Zyrtec as needed for symptomatic control.  Patient follow-up with the PCP. Patient agrees with this plan. Discussed with her strict return precautions, she verbalized  understanding. Patient is stable for discharge.     Amount and/or Complexity of Data Reviewed  External Data Reviewed: labs, radiology, ECG and notes.  Radiology: ordered.  ECG/medicine tests: ordered.    Risk  OTC drugs.  Prescription drug management.            Scribe Attestation:   Scribe #1: I performed the above scribed service and the documentation accurately describes the services I performed. I attest to the accuracy of the note.           I, Alayna Holdsworth,PA-C, personally performed the services described in this documentation. All medical record entries made by the scribe were at my direction and in my presence.  I have reviewed the chart and agree that the record reflects my personal performance and is accurate and complete.                      Clinical Impression:  Final diagnoses:  [R06.02] Shortness of breath  [R05.1] Acute cough  [J45.21] Mild intermittent asthma with acute exacerbation (Primary)  [Z87.01] History of pneumonia          ED Disposition Condition    Discharge Stable          ED Prescriptions       Medication Sig Dispense Start Date End Date Auth. Provider    albuterol (PROVENTIL/VENTOLIN HFA) 90 mcg/actuation inhaler Inhale 1-2 puffs into the lungs every 6 (six) hours as needed for Wheezing or Shortness of Breath. Rescue 8 g 6/16/2024 -- Holdsworth, Alayna, PA-C    promethazine-dextromethorphan (PROMETHAZINE-DM) 6.25-15 mg/5 mL Syrp Take 5 mLs by mouth every 4 (four) hours as needed (Cough). 180 mL 6/16/2024 -- Holdsworth, Alayna, PA-C    fluticasone propionate (FLONASE) 50 mcg/actuation nasal spray 1 spray (50 mcg total) by Each Nostril route 2 (two) times daily as needed for Rhinitis or Allergies. 15 g 6/16/2024 -- Holdsworth, Alayna, PA-C    cetirizine (ZYRTEC) 10 MG tablet Take 1 tablet (10 mg total) by mouth daily as needed for Allergies or Rhinitis. 30 tablet 6/16/2024 -- Holdsworth, Alayna, PA-C          Follow-up Information       Follow up With Specialties Details Why  Contact Info    Mann Shoemaker MD Family Medicine Call   [address] [facility]  [place] [address]  [phone]               Holdsworth, Alayna, PA-C  06/16/24 6145

## 2024-06-19 LAB
OHS QRS DURATION: 78 MS
OHS QTC CALCULATION: 436 MS

## 2025-04-28 ENCOUNTER — HOSPITAL ENCOUNTER (EMERGENCY)
Facility: HOSPITAL | Age: 70
Discharge: HOME | End: 2025-04-28
Attending: EMERGENCY MEDICINE
Payer: MEDICARE

## 2025-04-28 VITALS
RESPIRATION RATE: 18 BRPM | HEIGHT: 62 IN | HEART RATE: 72 BPM | TEMPERATURE: 98 F | SYSTOLIC BLOOD PRESSURE: 163 MMHG | DIASTOLIC BLOOD PRESSURE: 71 MMHG | WEIGHT: 160 LBS | BODY MASS INDEX: 29.44 KG/M2 | OXYGEN SATURATION: 97 %
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
## 2025-04-28 DIAGNOSIS — M79.641 RIGHT HAND PAIN: Primary | ICD-10-CM

## 2025-04-28 PROCEDURE — 25000003 PHARM REV CODE 250

## 2025-04-28 PROCEDURE — 99284 EMERGENCY DEPT VISIT MOD MDM: CPT | Mod: 25

## 2025-04-28 RX ORDER — ACETAMINOPHEN 500 MG
1000 TABLET ORAL
Status: COMPLETED | OUTPATIENT
Start: 2025-04-28 | End: 2025-04-28

## 2025-04-28 RX ORDER — ACETAMINOPHEN 500 MG
500 TABLET ORAL EVERY 6 HOURS PRN
Qty: 30 TABLET | Refills: 0 | Status: SHIPPED | OUTPATIENT
Start: 2025-04-28

## 2025-04-28 RX ADMIN — ACETAMINOPHEN 1000 MG: 500 TABLET ORAL at 12:04

## 2025-04-28 NOTE — ED TRIAGE NOTES
Pt presents with c/o right hand/thumb discomfort and swelling and right knee pain after falling out of her bed last night. Pt took 1/2 tramadol for pain.

## 2025-04-28 NOTE — DISCHARGE INSTRUCTIONS
Take tylenol or ibuprofen as directed for pain.    Thank you for coming to our Emergency Department today. It is important to remember that some problems or medical conditions are difficult to diagnose and may not be found or addressed during your Emergency Department visit.  These conditions often start with non-specific symptoms and can only be diagnosed on follow up visits with your primary care physician or specialist when the symptoms continue or change. Please remember that all medical conditions can change, and we cannot predict how you will be feeling tomorrow or the next day. Return to the ER with any questions/concerns, new/concerning symptoms, worsening or failure to improve.       Be sure to follow up with your primary care doctor and review all labs/imaging/tests that were performed during your ER visit with them. It is very common for us to identify non-emergent incidental findings which must be followed up with your primary care physician.  Some labs/imaging/tests may be outside of the normal range, and require non-emergent follow-up and/or further investigation/treatment/procedures/testing to help diagnose/exclude/prevent complications or other potentially serious medical conditions. Some abnormalities may not have been discussed or addressed during your ER visit.     An ER visit does not replace a primary care visit, and many screening tests or follow-up tests cannot be ordered by an ER doctor or performed by the ER. Some tests may even require pre-approval.    If you do not have a primary care doctor, you may contact the one listed on your discharge paperwork or you may also call the [facility] Appointment Desk at [phone] , or [address] [place] [address] at  [phone] to schedule an appointment, or establish care with a primary care doctor or even a specialist and to obtain information about local resources. It is important to your health that you have a primary care doctor.    Please take all  medications as directed. We have done our best to select a medication for you that will treat your condition however, all medications may potentially have side-effects and it is impossible to predict which medications may give you side-effects or what those side-effects (if any) those medications may give you.  If you feel that you are having a negative effect or side-effect of any medication you should stop taking those medications immediately and seek medical attention. If you feel that you are having a life-threatening reaction call 911.        Do not drive, swim, climb to height, take a bath, operate heavy machinery, drink alcohol or take potentially sedating medications, sign any legal documents or make any important decisions for 24 hours if you have received any pain medications, sedatives or mood altering drugs during your ER visit or within 24 hours of taking them if they have been prescribed to you.     You can find additional resources for Dentists, hearing aids, durable medical equipment, low cost pharmacies and other resources at [email or web address]

## 2025-04-28 NOTE — ED PROVIDER NOTES
Encounter Date: 4/28/2025       History     Chief Complaint   Patient presents with    Fall     Pt reports rolling off her bed this morning around 0300 am and landing on her right hand/wrist and right knee. Pt denies head injury, loc or anticoagulation use. Pt reports taking a tramadol at home without relief. Pt endorses swelling to right thumb.      Patient is a 69-year-old female past medical history of diabetes, hypertension, hypothyroidism is presenting for evaluation after a fall this morning.  Patient states that she was having a bad injury and she was trying to run away in the injury and threw herself out of her bed.  Patient reports she landed on the right side of her body.  She denies chest pain, shortness breath, or lightheadedness brown to falling.  Denies hitting her head or loss of consciousness.  Reports that she has been experiencing right knee pain and right wrist/hand pain.  States that she took half a tramadol at home which mildly helped. She denies numbness or tingling in her extremities.  She denies neck pain, back pain, chest pain, shortness breath, or abdominal pain.        Review of patient's allergies indicates:   Allergen Reactions    Asa [aspirin] Shortness Of Breath    Azithromycin Shortness Of Breath    Benadryl [diphenhydramine hcl] Shortness Of Breath    Codeine Shortness Of Breath     Airway closed    Latex, natural rubber Shortness Of Breath and Itching    Penicillins Shortness Of Breath, Itching and Swelling    Sulfa (sulfonamide antibiotics) Shortness Of Breath and Swelling    Vicodin [hydrocodone-acetaminophen] Shortness Of Breath, Swelling and Hallucinations    Iodinated contrast media Swelling     Past Medical History:   Diagnosis Date    Anxiety     Diabetes mellitus     Hypercholesteremia     Hypertension     Hypothyroidism      Past Surgical History:   Procedure Laterality Date    CYST REMOVAL      HYSTERECTOMY      WI REMOVAL OF OVARY/TUBE(S)      SURGICAL REMOVAL OF MASS OF  LOWER EXTREMITY Right     pt reported     TONSILLECTOMY       Family History   Problem Relation Name Age of Onset    Hypertension Father      Diabetes Mother      Breast cancer Neg Hx      Colon cancer Neg Hx      Ovarian cancer Neg Hx       Social History[1]  Review of Systems   Respiratory:  Negative for shortness of breath.    Cardiovascular:  Negative for chest pain.   Gastrointestinal:  Negative for abdominal pain.   Musculoskeletal:  Positive for arthralgias.   Neurological:  Negative for light-headedness, numbness and headaches.       Physical Exam     Initial Vitals [04/28/25 1128]   BP Pulse Resp Temp SpO2   (!) 176/96 85 16 98.3 °F (36.8 °C) 98 %      MAP       --         Physical Exam    Constitutional: She appears well-developed and well-nourished. She is not diaphoretic. No distress.   HENT:   Head: Normocephalic and atraumatic.   Right Ear: External ear normal.   Left Ear: External ear normal.   Eyes: Conjunctivae are normal. Right eye exhibits no discharge. Left eye exhibits no discharge.   Neck: Neck supple.   Normal range of motion.  Cardiovascular:  Normal rate and regular rhythm.           Pulmonary/Chest: Breath sounds normal. No respiratory distress. She has no wheezes. She has no rhonchi. She has no rales.   Abdominal: Abdomen is soft. She exhibits no distension. There is no abdominal tenderness. There is no rebound and no guarding.   Musculoskeletal:      Right wrist: Normal. No swelling, deformity or snuff box tenderness.      Left wrist: Normal. No swelling, deformity or snuff box tenderness.      Right hand: Swelling and tenderness present.      Cervical back: Normal range of motion and neck supple.      Comments: Tenderness to palpation and swelling present to the right thumb.     Neurological: She is alert and oriented to person, place, and time.   Skin: Skin is warm and dry. Capillary refill takes less than 2 seconds.   Psychiatric: She has a normal mood and affect.         ED Course    Procedures  Labs Reviewed - No data to display       Imaging Results              X-Ray Knee 3 View Right (Final result)  Result time 04/28/25 12:54:51      Final result by Malik Barrera MD (04/28/25 12:54:51)                   Impression:      See above      Electronically signed by: Malik Barrera MD  Date:    04/28/2025  Time:    12:54               Narrative:    EXAMINATION:  XR KNEE 3 VIEW RIGHT    CLINICAL HISTORY:  Unspecified fall, initial encounter    TECHNIQUE:  AP, lateral, and Merchant views of the right knee were performed.    COMPARISON:  None    FINDINGS:  Mild DJD.  No fracture or dislocation.  No bone destruction identified                                       X-Ray Hand 3 view Right (Final result)  Result time 04/28/25 13:11:27      Final result by Foreign Mcfarland Jr., MD (04/28/25 13:11:27)                   Impression:      See above      Electronically signed by: Foreign Holley Jr  Date:    04/28/2025  Time:    13:11               Narrative:    EXAMINATION:  XR HAND COMPLETE 3 VIEW RIGHT    CLINICAL HISTORY:  fall;    TECHNIQUE:  XR HAND COMPLETE 3 VIEW RIGHT    COMPARISON:  None    FINDINGS:  There is osteoarthritis of the D IP joints and 1st and 2nd MCP joints to a lesser degree.  There is no evidence of fracture or retained foreign body.                                       X-Ray Wrist Complete Right (Final result)  Result time 04/28/25 12:52:17      Final result by Malik Barrera MD (04/28/25 12:52:17)                   Impression:      See above      Electronically signed by: Malik Barrera MD  Date:    04/28/2025  Time:    12:52               Narrative:    EXAMINATION:  XR WRIST COMPLETE 3 VIEWS RIGHT    CLINICAL HISTORY:  Unspecified fall, initial encounter    TECHNIQUE:  PA, lateral, and oblique views of the right wrist were performed.    COMPARISON:  None    FINDINGS:  No fracture or dislocation.  No bone destruction identified.  Mild DJD.  There is osteophyte or exostosis  noted arising from the base of the 1st metacarpal bone.                                       Medications   acetaminophen tablet 1,000 mg (1,000 mg Oral Given 4/28/25 1213)     Medical Decision Making  Patient is a 69-year-old female past medical history of diabetes, hypertension, hypothyroidism is presenting for evaluation after a fall this morning.     Differential includes but not limited to hand fracture, dislocation, soft tissue contusion, ligamental injury, muscular strain.    Patient is alert and afebrile.  Patient is nontoxic appearing, not in distress.  On physical exam patient ambulating without difficulty.  Lungs are clear to auscultation.  Patient is speaking in full sentences without difficulty.  Normal heart rate and rhythm.  Abdomen is soft and nondistended.  No abdominal tenderness rebound or guarding.  Negative McBurney's or Raza's sign.  Right thumb swelling and tenderness present.  No wrist tenderness or snuffbox tenderness present bilaterally.  Normal flexion-extension of the elbows and shoulders bilaterally without tenderness.  No tenderness to the lower extremities bilaterally.  Normal flexion-extension of the knees without difficulty.  No midline spinal tenderness present.  Normal range of motion of neck without pain.  No signs of fluid overload.  No leg edema.  Strong distal radial pulse bilaterally.    X-rays are negative for acute fractures or dislocations.  Patient given Tylenol for pain. Discussed with patient symptoms most likely due to soft tissue contusion after fall.  Discussed with patient taking Tylenol and ibuprofen as directed for pain. Strict return precautions given for new or worsening symptoms.  Recommended follow up with PCP in 2 days.  Patient is in agreeance to this plan, and expresses understanding return precautions and follow up plan.  I thoroughly answered all patient's questions and concerns.  Patient is stable for discharge at this time.    Risk  OTC drugs.                ED Course as of 04/28/25 1330   Mon Apr 28, 2025   1133 BP(!): 176/96 [TM]   1133 MAP (mmHg): 125 [TM]   1133 Temp: 98.3 °F (36.8 °C) [TM]   1133 Pulse: 85 [TM]   1133 Resp: 16 [TM]   1133 SpO2: 98 % [TM]      ED Course User Index  [TM] Aldo Thompson PA-C                           Clinical Impression:  Final diagnoses:  [W19.XXXA] Fall  [M79.641] Right hand pain (Primary)          ED Disposition Condition    Discharge Stable          ED Prescriptions       Medication Sig Dispense Start Date End Date Auth. Provider    acetaminophen (TYLENOL) 500 MG tablet Take 1 tablet (500 mg total) by mouth every 6 (six) hours as needed for Pain. 30 tablet 4/28/2025 -- Shara Connolly PA-C          Follow-up Information       Follow up With Specialties Details Why Contact Info    Mann Shoemaker MD Family Medicine Schedule an appointment as soon as possible for a visit in 2 days For follow up [address] [place] [id number]  [phone]      [facility] Emergency Dept Emergency Medicine Go to  If new symptoms develop or symptoms worsen [address] [facility] [place] [address] [phone]                 [1]   Social History  Tobacco Use    Smoking status: Never    Smokeless tobacco: Never   Substance Use Topics    Alcohol use: No    Drug use: No        Shara Connolly PA-C  04/28/25 1330